# Patient Record
Sex: FEMALE | Race: OTHER | HISPANIC OR LATINO | Employment: FULL TIME | URBAN - METROPOLITAN AREA
[De-identification: names, ages, dates, MRNs, and addresses within clinical notes are randomized per-mention and may not be internally consistent; named-entity substitution may affect disease eponyms.]

---

## 2022-04-21 SDOH — ECONOMIC STABILITY: FOOD INSECURITY: WITHIN THE PAST 12 MONTHS, YOU WORRIED THAT YOUR FOOD WOULD RUN OUT BEFORE YOU GOT MONEY TO BUY MORE.: NEVER TRUE

## 2022-04-21 SDOH — ECONOMIC STABILITY: TRANSPORTATION INSECURITY
IN THE PAST 12 MONTHS, HAS THE LACK OF TRANSPORTATION KEPT YOU FROM MEDICAL APPOINTMENTS OR FROM GETTING MEDICATIONS?: NO

## 2022-04-21 SDOH — ECONOMIC STABILITY: INCOME INSECURITY: IN THE LAST 12 MONTHS, WAS THERE A TIME WHEN YOU WERE NOT ABLE TO PAY THE MORTGAGE OR RENT ON TIME?: NO

## 2022-04-21 SDOH — ECONOMIC STABILITY: INCOME INSECURITY: HOW HARD IS IT FOR YOU TO PAY FOR THE VERY BASICS LIKE FOOD, HOUSING, MEDICAL CARE, AND HEATING?: NOT VERY HARD

## 2022-04-21 SDOH — HEALTH STABILITY: PHYSICAL HEALTH: ON AVERAGE, HOW MANY DAYS PER WEEK DO YOU ENGAGE IN MODERATE TO STRENUOUS EXERCISE (LIKE A BRISK WALK)?: 3 DAYS

## 2022-04-21 SDOH — ECONOMIC STABILITY: HOUSING INSECURITY
IN THE LAST 12 MONTHS, WAS THERE A TIME WHEN YOU DID NOT HAVE A STEADY PLACE TO SLEEP OR SLEPT IN A SHELTER (INCLUDING NOW)?: NO

## 2022-04-21 SDOH — ECONOMIC STABILITY: TRANSPORTATION INSECURITY
IN THE PAST 12 MONTHS, HAS LACK OF TRANSPORTATION KEPT YOU FROM MEETINGS, WORK, OR FROM GETTING THINGS NEEDED FOR DAILY LIVING?: NO

## 2022-04-21 SDOH — ECONOMIC STABILITY: HOUSING INSECURITY: IN THE LAST 12 MONTHS, HOW MANY PLACES HAVE YOU LIVED?: 3

## 2022-04-21 SDOH — HEALTH STABILITY: PHYSICAL HEALTH: ON AVERAGE, HOW MANY MINUTES DO YOU ENGAGE IN EXERCISE AT THIS LEVEL?: 30 MIN

## 2022-04-21 SDOH — ECONOMIC STABILITY: FOOD INSECURITY: WITHIN THE PAST 12 MONTHS, THE FOOD YOU BOUGHT JUST DIDN'T LAST AND YOU DIDN'T HAVE MONEY TO GET MORE.: NEVER TRUE

## 2022-04-21 SDOH — ECONOMIC STABILITY: TRANSPORTATION INSECURITY
IN THE PAST 12 MONTHS, HAS LACK OF RELIABLE TRANSPORTATION KEPT YOU FROM MEDICAL APPOINTMENTS, MEETINGS, WORK OR FROM GETTING THINGS NEEDED FOR DAILY LIVING?: NO

## 2022-04-21 SDOH — HEALTH STABILITY: MENTAL HEALTH
STRESS IS WHEN SOMEONE FEELS TENSE, NERVOUS, ANXIOUS, OR CAN'T SLEEP AT NIGHT BECAUSE THEIR MIND IS TROUBLED. HOW STRESSED ARE YOU?: RATHER MUCH

## 2022-04-21 ASSESSMENT — SOCIAL DETERMINANTS OF HEALTH (SDOH)
HOW OFTEN DO YOU HAVE SIX OR MORE DRINKS ON ONE OCCASION: NEVER
HOW OFTEN DO YOU ATTENT MEETINGS OF THE CLUB OR ORGANIZATION YOU BELONG TO?: NEVER
HOW OFTEN DO YOU ATTEND CHURCH OR RELIGIOUS SERVICES?: NEVER
HOW OFTEN DO YOU ATTEND CHURCH OR RELIGIOUS SERVICES?: NEVER
HOW HARD IS IT FOR YOU TO PAY FOR THE VERY BASICS LIKE FOOD, HOUSING, MEDICAL CARE, AND HEATING?: NOT VERY HARD
HOW OFTEN DO YOU GET TOGETHER WITH FRIENDS OR RELATIVES?: NEVER
IN A TYPICAL WEEK, HOW MANY TIMES DO YOU TALK ON THE PHONE WITH FAMILY, FRIENDS, OR NEIGHBORS?: NEVER
HOW OFTEN DO YOU HAVE A DRINK CONTAINING ALCOHOL: MONTHLY OR LESS
WITHIN THE PAST 12 MONTHS, YOU WORRIED THAT YOUR FOOD WOULD RUN OUT BEFORE YOU GOT THE MONEY TO BUY MORE: NEVER TRUE
HOW OFTEN DO YOU GET TOGETHER WITH FRIENDS OR RELATIVES?: NEVER
HOW OFTEN DO YOU ATTENT MEETINGS OF THE CLUB OR ORGANIZATION YOU BELONG TO?: NEVER
HOW MANY DRINKS CONTAINING ALCOHOL DO YOU HAVE ON A TYPICAL DAY WHEN YOU ARE DRINKING: 1 OR 2
DO YOU BELONG TO ANY CLUBS OR ORGANIZATIONS SUCH AS CHURCH GROUPS UNIONS, FRATERNAL OR ATHLETIC GROUPS, OR SCHOOL GROUPS?: NO
DO YOU BELONG TO ANY CLUBS OR ORGANIZATIONS SUCH AS CHURCH GROUPS UNIONS, FRATERNAL OR ATHLETIC GROUPS, OR SCHOOL GROUPS?: NO
IN A TYPICAL WEEK, HOW MANY TIMES DO YOU TALK ON THE PHONE WITH FAMILY, FRIENDS, OR NEIGHBORS?: NEVER

## 2022-04-21 ASSESSMENT — LIFESTYLE VARIABLES
HOW MANY STANDARD DRINKS CONTAINING ALCOHOL DO YOU HAVE ON A TYPICAL DAY: 1 OR 2
HOW OFTEN DO YOU HAVE SIX OR MORE DRINKS ON ONE OCCASION: NEVER
HOW OFTEN DO YOU HAVE A DRINK CONTAINING ALCOHOL: MONTHLY OR LESS

## 2022-04-22 ENCOUNTER — OFFICE VISIT (OUTPATIENT)
Dept: MEDICAL GROUP | Facility: PHYSICIAN GROUP | Age: 37
End: 2022-04-22
Payer: COMMERCIAL

## 2022-04-22 VITALS
SYSTOLIC BLOOD PRESSURE: 100 MMHG | WEIGHT: 210 LBS | TEMPERATURE: 98 F | HEIGHT: 68 IN | HEART RATE: 69 BPM | OXYGEN SATURATION: 98 % | BODY MASS INDEX: 31.83 KG/M2 | DIASTOLIC BLOOD PRESSURE: 62 MMHG

## 2022-04-22 DIAGNOSIS — Z76.89 ENCOUNTER TO ESTABLISH CARE: ICD-10-CM

## 2022-04-22 DIAGNOSIS — G43.009 MIGRAINE WITHOUT AURA AND WITHOUT STATUS MIGRAINOSUS, NOT INTRACTABLE: ICD-10-CM

## 2022-04-22 DIAGNOSIS — Z00.00 WELLNESS EXAMINATION: ICD-10-CM

## 2022-04-22 DIAGNOSIS — R63.5 EXCESSIVE WEIGHT GAIN: ICD-10-CM

## 2022-04-22 DIAGNOSIS — Z87.898 HISTORY OF POSTNASAL DRIP: ICD-10-CM

## 2022-04-22 DIAGNOSIS — K21.9 GASTROESOPHAGEAL REFLUX DISEASE, UNSPECIFIED WHETHER ESOPHAGITIS PRESENT: ICD-10-CM

## 2022-04-22 DIAGNOSIS — E66.9 OBESITY (BMI 30-39.9): ICD-10-CM

## 2022-04-22 PROCEDURE — 99204 OFFICE O/P NEW MOD 45 MIN: CPT | Performed by: NURSE PRACTITIONER

## 2022-04-22 RX ORDER — RIZATRIPTAN BENZOATE 10 MG/1
TABLET ORAL
COMMUNITY
Start: 2019-02-11 | End: 2022-05-10 | Stop reason: SDUPTHER

## 2022-04-22 RX ORDER — OMEPRAZOLE 20 MG/1
20 CAPSULE, DELAYED RELEASE ORAL DAILY
Qty: 90 CAPSULE | Refills: 1 | Status: SHIPPED | OUTPATIENT
Start: 2022-04-22 | End: 2022-04-26 | Stop reason: SDUPTHER

## 2022-04-22 RX ORDER — PROPRANOLOL HYDROCHLORIDE 160 MG/1
CAPSULE, EXTENDED RELEASE ORAL
COMMUNITY
Start: 2019-12-20 | End: 2022-05-10 | Stop reason: SDUPTHER

## 2022-04-22 RX ORDER — OMEPRAZOLE 20 MG/1
20 CAPSULE, DELAYED RELEASE ORAL DAILY
Qty: 90 CAPSULE | Refills: 0 | Status: SHIPPED | OUTPATIENT
Start: 2022-04-22 | End: 2022-04-22

## 2022-04-22 ASSESSMENT — PATIENT HEALTH QUESTIONNAIRE - PHQ9: CLINICAL INTERPRETATION OF PHQ2 SCORE: 0

## 2022-04-22 NOTE — PATIENT INSTRUCTIONS
Miralax 1cap + Docusate 1 tablet x3days; if still constipated may continue daily stool softner till stool is soft.  Increase oral fiber with fruits and vegetables.    Let me know if still having stomach pain after 1 month of Omeprazole.      For post nasal drainage, you can try OTC antihistamine like zyrtec, allegra, claritin; you can also try nasal irrigation with normal saline rinse or netti pot.      Constipation, Adult  Constipation is when a person:  · Poops (has a bowel movement) fewer times in a week than normal.  · Has a hard time pooping.  · Has poop that is dry, hard, or bigger than normal.  Follow these instructions at home:  Eating and drinking    · Eat foods that have a lot of fiber, such as:  ? Fresh fruits and vegetables.  ? Whole grains.  ? Beans.  · Eat less of foods that are high in fat, low in fiber, or overly processed, such as:  ? French fries.  ? Hamburgers.  ? Cookies.  ? Candy.  ? Soda.  · Drink enough fluid to keep your pee (urine) clear or pale yellow.  General instructions  · Exercise regularly or as told by your doctor.  · Go to the restroom when you feel like you need to poop. Do not hold it in.  · Take over-the-counter and prescription medicines only as told by your doctor. These include any fiber supplements.  · Do pelvic floor retraining exercises, such as:  ? Doing deep breathing while relaxing your lower belly (abdomen).  ? Relaxing your pelvic floor while pooping.  · Watch your condition for any changes.  · Keep all follow-up visits as told by your doctor. This is important.  Contact a doctor if:  · You have pain that gets worse.  · You have a fever.  · You have not pooped for 4 days.  · You throw up (vomit).  · You are not hungry.  · You lose weight.  · You are bleeding from the anus.  · You have thin, pencil-like poop (stool).  Get help right away if:  · You have a fever, and your symptoms suddenly get worse.  · You leak poop or have blood in your poop.  · Your belly feels hard  or bigger than normal (is bloated).  · You have very bad belly pain.  · You feel dizzy or you faint.  This information is not intended to replace advice given to you by your health care provider. Make sure you discuss any questions you have with your health care provider.  Document Released: 06/05/2009 Document Revised: 11/30/2018 Document Reviewed: 06/07/2017  Elsevier Patient Education © 2020 [employer]      Postnasal Drip  Postnasal drip is the feeling of mucus going down the back of your throat. Mucus is a slimy substance that moistens and cleans your nose and throat, as well as the air pockets in face bones near your forehead and cheeks (sinuses). Small amounts of mucus pass from your nose and sinuses down the back of your throat all the time. This is normal. When you produce too much mucus or the mucus gets too thick, you can feel it.  Some common causes of postnasal drip include:  · Having more mucus because of:  ? A cold or the flu.  ? Allergies.  ? Cold air.  ? Certain medicines.  · Having more mucus that is thicker because of:  ? A sinus or nasal infection.  ? Dry air.  ? A food allergy.  Follow these instructions at home:  Relieving discomfort    · Gargle with a salt-water mixture 3-4 times a day or as needed. To make a salt-water mixture, completely dissolve ½-1 tsp of salt in 1 cup of warm water.  · If the air in your home is dry, use a humidifier to add moisture to the air.  · Use a saline spray or container (neti pot) to flush out the nose (nasal irrigation). These methods can help clear away mucus and keep the nasal passages moist.  General instructions  · Take over-the-counter and prescription medicines only as told by your health care provider.  · Follow instructions from your health care provider about eating or drinking restrictions. You may need to avoid caffeine.  · Avoid things that you know you are allergic to (allergens), like dust, mold, pollen, pets, or certain foods.  · Drink enough  fluid to keep your urine pale yellow.  · Keep all follow-up visits as told by your health care provider. This is important.  Contact a health care provider if:  · You have a fever.  · You have a sore throat.  · You have difficulty swallowing.  · You have headache.  · You have sinus pain.  · You have a cough that does not go away.  · The mucus from your nose becomes thick and is green or yellow in color.  · You have cold or flu symptoms that last more than 10 days.  Summary  · Postnasal drip is the feeling of mucus going down the back of your throat.  · If your health care provider approves, use nasal irrigation or a nasal spray 2?4 times a day.  · Avoid things that you know you are allergic to (allergens), like dust, mold, pollen, pets, or certain foods.  This information is not intended to replace advice given to you by your health care provider. Make sure you discuss any questions you have with your health care provider.  Document Released: 04/02/2018 Document Revised: 04/10/2020 Document Reviewed: 04/02/2018  Elsevier Patient Education © 2020 [employer]

## 2022-04-22 NOTE — PROGRESS NOTES
"Subjective:     CC:    Chief Complaint   Patient presents with   • Establish Care   • Referral Needed     GI and Neuro         HISTORY OF THE PRESENT ILLNESS: Patient is a 36 y.o. female, here today to establish care. Recent relocation from [place] 10/2021. The below problems were discussed/reviewed at this visit:    Problem   Excessive Weight Gain   Gastroesophageal Reflux Disease    Reports epigastric pain x1 month; worse after she eats; no nausea/vomiting; stool sometimes hard, sometimes she will have small watery stool      History of Postnasal Drip    Reports post nasal drainage for some months now; sometimes right ear also gets congested and painful     Migraine Without Aura and Without Status Migrainosus, Not Intractable    Diagnosed migraine  by PCP; started seeing neurology around . Inderal was increased from 120mg to 160mg around .   Currently taking Inderal LA 160mg QD for prevention, Rizatriptan 10mg prn migraine          Current Outpatient Medications Ordered in Epic   Medication Sig Dispense Refill   • propranolol CR (INDERAL LA) 160 MG CAPSULE SR 24 HR capsule      • rizatriptan (MAXALT) 10 MG tablet      • omeprazole (PRILOSEC) 20 MG delayed-release capsule Take 1 Capsule by mouth every day. 90 Capsule 1     No current Epic-ordered facility-administered medications on file.        No past surgical history on file.     Allergies:  Morphine    Health Maintenance: Completed  - IUD Mirena/PAP 2019, normal PAP (get results); no menses  - A1, sexually active male/     ROS: per HPI      Objective:     Exam: /62 (BP Location: Left arm, Patient Position: Sitting, BP Cuff Size: Adult)   Pulse 69   Temp 36.7 °C (98 °F) (Temporal)   Ht 1.727 m (5' 8\")   Wt 95.3 kg (210 lb)   SpO2 98%  Body mass index is 31.93 kg/m².    Physical Exam  Constitutional:       Appearance: Normal appearance.   Cardiovascular:      Rate and Rhythm: Normal rate and regular rhythm.      Pulses: " Ann, patient's mother requesting to speak with writer. Writer informed patient's family members of no visitor policy at this time in the respiratory area of ED. When evaluation completed by MD-will alert that family wants update with patient's permission. Number for mother is [phone].   Normal pulses.      Heart sounds: Normal heart sounds.   Pulmonary:      Effort: Pulmonary effort is normal.      Breath sounds: Normal breath sounds.   Musculoskeletal:         General: Normal range of motion.      Cervical back: Normal range of motion and neck supple.   Skin:     General: Skin is warm and dry.   Neurological:      General: No focal deficit present.      Mental Status: She is alert and oriented to person, place, and time.   Psychiatric:         Mood and Affect: Mood normal.         Behavior: Behavior normal.         Thought Content: Thought content normal.         Judgment: Judgment normal.       Assessment & Plan:   36 y.o. female with the following -    Problem List Items Addressed This Visit     Migraine without aura and without status migrainosus, not intractable     Migraines managed on current regimen; last prn Rizatriptan used 3 weeks ago, relief with 1 dose   - continue Inderal LA 160mg QD for prevention, Rizatriptan 10mg prn migraine  - she would like to remain under care of neurology so I have sent referral to establish with headache clinic         Relevant Medications    propranolol CR (INDERAL LA) 160 MG CAPSULE SR 24 HR capsule    rizatriptan (MAXALT) 10 MG tablet    Other Relevant Orders    Comp Metabolic Panel    Referral to Neurology    Excessive weight gain     Reports weight gain the past year, despite increasing exercise  - we discussed role of dietary intake which she is also watching  - check TSH, lipid, CMP         Relevant Orders    Comp Metabolic Panel    Lipid Profile    TSH WITH REFLEX TO FT4    Gastroesophageal reflux disease     epigastric pain x1 month; worse after she eats; no nausea/vomiting; stool sometimes hard, sometimes she will have small watery stool; normal BS on exam, epigastric tender with palpation  - she will take OTC laxative/stool softner for 1-3 days to relieve her constipation   - start omeprazole 20mg QD; may trial off after 2-4 weeks if symptoms are  gone  - increase dietary fiber (fresh fruits, vegetables)         Relevant Medications    omeprazole (PRILOSEC) 20 MG delayed-release capsule    History of postnasal drip     Throat, ears/TM normal on exam today  - may trial OTC antihistamine daily for 2-4 weeks           Other Visit Diagnoses     Wellness examination        Relevant Orders    Comp Metabolic Panel    Lipid Profile    TSH WITH REFLEX TO FT4        Educated in proper administration of medication(s) ordered today including safety, possible SE, risks, benefits, rationale and alternatives to therapy.     Return in about 3 months (around 7/22/2022) for PAP.    Please note that this dictation was created using voice recognition software. I have made every reasonable attempt to correct obvious errors, but I expect that there are errors of grammar and possibly content that I did not discover before finalizing the note.

## 2022-04-23 PROBLEM — K21.9 GASTROESOPHAGEAL REFLUX DISEASE: Status: ACTIVE | Noted: 2022-04-23

## 2022-04-23 PROBLEM — R63.5 EXCESSIVE WEIGHT GAIN: Status: ACTIVE | Noted: 2022-04-23

## 2022-04-23 PROBLEM — Z87.898 HISTORY OF POSTNASAL DRIP: Status: ACTIVE | Noted: 2022-04-23

## 2022-04-23 PROBLEM — E66.9 OBESITY (BMI 30-39.9): Status: ACTIVE | Noted: 2022-04-23

## 2022-04-23 NOTE — ASSESSMENT & PLAN NOTE
epigastric pain x1 month; worse after she eats; no nausea/vomiting; stool sometimes hard, sometimes she will have small watery stool; normal BS on exam, epigastric tender with palpation  - she will take OTC laxative/stool softner for 1-3 days to relieve her constipation   - start omeprazole 20mg QD; may trial off after 2-4 weeks if symptoms are gone  - increase dietary fiber (fresh fruits, vegetables)

## 2022-04-23 NOTE — ASSESSMENT & PLAN NOTE
Migraines managed on current regimen; last prn Rizatriptan used 3 weeks ago, relief with 1 dose   - continue Inderal LA 160mg QD for prevention, Rizatriptan 10mg prn migraine  - she would like to remain under care of neurology so I have sent referral to establish with headache clinic

## 2022-04-23 NOTE — ASSESSMENT & PLAN NOTE
Reports weight gain the past year, despite increasing exercise  - we discussed role of dietary intake which she is also watching  - check TSH, lipid, CMP

## 2022-04-26 ENCOUNTER — HOSPITAL ENCOUNTER (OUTPATIENT)
Dept: LAB | Facility: MEDICAL CENTER | Age: 37
End: 2022-04-26
Attending: NURSE PRACTITIONER
Payer: COMMERCIAL

## 2022-04-26 DIAGNOSIS — G43.009 MIGRAINE WITHOUT AURA AND WITHOUT STATUS MIGRAINOSUS, NOT INTRACTABLE: ICD-10-CM

## 2022-04-26 DIAGNOSIS — Z00.00 WELLNESS EXAMINATION: ICD-10-CM

## 2022-04-26 DIAGNOSIS — R63.5 EXCESSIVE WEIGHT GAIN: ICD-10-CM

## 2022-04-26 DIAGNOSIS — K21.9 GASTROESOPHAGEAL REFLUX DISEASE, UNSPECIFIED WHETHER ESOPHAGITIS PRESENT: ICD-10-CM

## 2022-04-26 DIAGNOSIS — E66.9 OBESITY (BMI 30-39.9): ICD-10-CM

## 2022-04-26 LAB
ALBUMIN SERPL BCP-MCNC: 3.6 G/DL (ref 3.2–4.9)
ALBUMIN/GLOB SERPL: 1.6 G/DL
ALP SERPL-CCNC: 34 U/L (ref 30–99)
ALT SERPL-CCNC: 12 U/L (ref 2–50)
ANION GAP SERPL CALC-SCNC: 9 MMOL/L (ref 7–16)
AST SERPL-CCNC: 18 U/L (ref 12–45)
BILIRUB SERPL-MCNC: 0.5 MG/DL (ref 0.1–1.5)
BUN SERPL-MCNC: 11 MG/DL (ref 8–22)
CALCIUM SERPL-MCNC: 9 MG/DL (ref 8.5–10.5)
CHLORIDE SERPL-SCNC: 107 MMOL/L (ref 96–112)
CHOLEST SERPL-MCNC: 141 MG/DL (ref 100–199)
CO2 SERPL-SCNC: 24 MMOL/L (ref 20–33)
CREAT SERPL-MCNC: 0.59 MG/DL (ref 0.5–1.4)
FASTING STATUS PATIENT QL REPORTED: NORMAL
GFR SERPLBLD CREATININE-BSD FMLA CKD-EPI: 121 ML/MIN/1.73 M 2
GLOBULIN SER CALC-MCNC: 2.3 G/DL (ref 1.9–3.5)
GLUCOSE SERPL-MCNC: 89 MG/DL (ref 65–99)
HDLC SERPL-MCNC: 52 MG/DL
LDLC SERPL CALC-MCNC: 81 MG/DL
POTASSIUM SERPL-SCNC: 4.5 MMOL/L (ref 3.6–5.5)
PROT SERPL-MCNC: 5.9 G/DL (ref 6–8.2)
SODIUM SERPL-SCNC: 140 MMOL/L (ref 135–145)
TRIGL SERPL-MCNC: 42 MG/DL (ref 0–149)
TSH SERPL DL<=0.005 MIU/L-ACNC: 3.07 UIU/ML (ref 0.38–5.33)

## 2022-04-26 PROCEDURE — 80053 COMPREHEN METABOLIC PANEL: CPT

## 2022-04-26 PROCEDURE — 80061 LIPID PANEL: CPT

## 2022-04-26 PROCEDURE — 36415 COLL VENOUS BLD VENIPUNCTURE: CPT

## 2022-04-26 PROCEDURE — 84443 ASSAY THYROID STIM HORMONE: CPT

## 2022-04-26 RX ORDER — OMEPRAZOLE 20 MG/1
20 CAPSULE, DELAYED RELEASE ORAL DAILY
Qty: 90 CAPSULE | Refills: 3 | Status: SHIPPED | OUTPATIENT
Start: 2022-04-26 | End: 2022-08-24

## 2022-05-03 ENCOUNTER — TELEPHONE (OUTPATIENT)
Dept: MEDICAL GROUP | Facility: PHYSICIAN GROUP | Age: 37
End: 2022-05-03
Payer: COMMERCIAL

## 2022-05-03 NOTE — TELEPHONE ENCOUNTER
MEDICATION PRIOR AUTHORIZATION NEEDED:    1. Name of Medication: Omeprazole    2. Requested By (Name of Pharmacy): [facility]     3. Is insurance on file current? yes    Pt informed that the omeprazole is not covered by insurance. I did look it up on [facility] and she can get it from the pharmacy with coupon for 3$-20$ depending on where she goes.

## 2022-05-10 ENCOUNTER — OFFICE VISIT (OUTPATIENT)
Dept: NEUROLOGY | Facility: MEDICAL CENTER | Age: 37
End: 2022-05-10
Attending: NURSE PRACTITIONER
Payer: COMMERCIAL

## 2022-05-10 VITALS
SYSTOLIC BLOOD PRESSURE: 110 MMHG | HEART RATE: 58 BPM | HEIGHT: 69 IN | DIASTOLIC BLOOD PRESSURE: 82 MMHG | BODY MASS INDEX: 31.55 KG/M2 | WEIGHT: 213 LBS | OXYGEN SATURATION: 98 % | RESPIRATION RATE: 16 BRPM | TEMPERATURE: 98 F

## 2022-05-10 DIAGNOSIS — G43.011 INTRACTABLE MIGRAINE WITHOUT AURA AND WITH STATUS MIGRAINOSUS: ICD-10-CM

## 2022-05-10 PROCEDURE — 99203 OFFICE O/P NEW LOW 30 MIN: CPT | Performed by: NURSE PRACTITIONER

## 2022-05-10 PROCEDURE — 99211 OFF/OP EST MAY X REQ PHY/QHP: CPT | Performed by: NURSE PRACTITIONER

## 2022-05-10 RX ORDER — PROPRANOLOL HYDROCHLORIDE 160 MG/1
160 CAPSULE, EXTENDED RELEASE ORAL
Qty: 90 CAPSULE | Refills: 3 | Status: SHIPPED | OUTPATIENT
Start: 2022-05-10 | End: 2023-05-10

## 2022-05-10 RX ORDER — RIZATRIPTAN BENZOATE 10 MG/1
10 TABLET ORAL
Qty: 10 TABLET | Refills: 11 | Status: SHIPPED | OUTPATIENT
Start: 2022-05-10 | End: 2023-05-10

## 2022-05-10 ASSESSMENT — ENCOUNTER SYMPTOMS
BACK PAIN: 0
WEAKNESS: 0
NERVOUS/ANXIOUS: 1
NECK PAIN: 0
NAUSEA: 0
FOCAL WEAKNESS: 0
HEARTBURN: 1
BLURRED VISION: 0
SINUS PAIN: 0
HEADACHES: 1
DEPRESSION: 1
COUGH: 0
VOMITING: 0
SPEECH CHANGE: 0
INSOMNIA: 1

## 2022-05-10 ASSESSMENT — PAIN SCALES - GENERAL: PAINLEVEL: NO PAIN

## 2022-05-10 NOTE — PROGRESS NOTES
Subjective      HPI     Arely Matt is a 36 y.o. female who presents for chronic migraines.    She was referred by her PCP Dr. Inman     She moved here from [place] recently, she had a neurologist there.      Had MRI of brain in [place] 4/2020 during a very bad migraine spell, she states it was normal.     PMH:  GERD  Social: She denies drug or tobacco use, rare alcohol, she works in IT for the [employer]     Age at Onset:  14  Triggers alcohol, stress, dehydration   Alleviating factors:  Laying down in dark room, warmth and ice,  Meds tried and result:         Preventative:  Medication Dose/length of treatment Result/side effects   Inderal LA 160mg X 2 years Reduced migraines from 10 per month to 3-4 month, they are much less severe   Topiramate  Speech difficulty   Nortriptyline  Excessive fatigue                                         Abortive:   Medication Dose/length of treatment Result/side effects   Rizatriptan   Knocks out headache   Ibuprofen  helps                                        Hormones:  none  Caffeine use: rare  OTC medications--frequency: twice per week ibuprofen  How many days per month:3-4 days per month  Missed days of school/work in past 6 months:  none  Characteristics:                   A) Location: on the left side, behind eye temple, base of neck              B)Duration:  Longest one lasted up to 2 weeks, typically can last 1-2 days without medicine, with medicine lasts for 2 hours.              C)Intensity: 5-6/10               D) Quality of pain: pressure, sharp pain in eyes              E) Associated Symptoms: blurry vision, difficulty concentrating   N&V: both   Photo/phonophobia:  Both   Aura: none   Prodrome: none  ER/Urgent care visits in past 6 months: none   Sleep schedule:  Tries to stay on a regular schedule, has difficulty falling asleep, gets about 5 hours of sleep.       Review of Systems   HENT: Negative for congestion and sinus pain.    Eyes: Negative for  "blurred vision.   Respiratory: Negative for cough.    Cardiovascular: Negative for chest pain.   Gastrointestinal: Positive for heartburn. Negative for nausea and vomiting.   Musculoskeletal: Negative for back pain and neck pain.   Neurological: Positive for headaches. Negative for speech change, focal weakness and weakness.   Psychiatric/Behavioral: Positive for depression. The patient is nervous/anxious and has insomnia.          Objective     /82 (BP Location: Left arm, Patient Position: Sitting, BP Cuff Size: Adult)   Pulse (!) 58   Temp 36.7 °C (98 °F) (Temporal)   Resp 16   Ht 1.753 m (5' 9\")   Wt 96.6 kg (213 lb)   SpO2 98%   BMI 31.45 kg/m²        PHYSICAL ASSESSMENT  Constitutional:  Alert, no apparent distress,  Psych:   mood and affect WNL  Muskuloskeletal:  Moves all extremities equally, strength 5/5  BUE/BLE flexors/extensors, no drift  NEUROLOGICAL ASSESSMENT  Oriented X 4, speech fluent, naming and memory intact  CN II: Visual fields are full to confrontation. Fundoscopic exam is normal with sharp discs and no vascular changes. Pupils are 4 mm and briskly reactive to light.   CN III: IV, VI  EOMs intact, no ptosis  CN V: Facial sensation is intact to pinprick in all 3 divisions bilaterally. Corneal responses are intact.  CN VII: Face is symmetric with normal eye closure and smile.  CN VIII Hearing is normal to rubbing fingers  CN IX, X: Palate elevates symmetrically. Phonation is normal.  CN XI: Head turning and shoulder shrug are intact  CN XII: Tongue is midline with normal movements and no atrophy.                           Sensation to PP equal bilaterally                 No limb ataxia with finger to nose and heel to shin                 Ambulates with steady gait.                 Rhomberg negative                Biceps,brachioradialis, tricep, and patellar reflexes all 2+     Cardiovascular:    S1S2, no abnormal rhythm auscultated, no peripheral edema  Neck:                     No " carotid bruits noted   Pulmonary:            Respirations easy, lungs clear to auscultation all fields.     Skin:                     No obvious rashes.             Assessment & Plan     1. Intractable migraine without aura and with status migrainosus     Well controlled with Inderal, gets 100% relief with Rizatriptan      Continue Inderal CR 160mg daily    Continue Rizatriptan for rescue.      I recommend the following over the counter supplements every night at bedtime:  Start magnesium oxide 400mg gel cap by mouth every night, may take extra dose if needed for headache (over the counter), hold for diarrhea         Start Riboflavin (Vitamin B2) 400mg by mouth every night (over the counter),may turn urine bright yellow         Start COQ 10, take 300mg every night. (over the counter)          Attempt to go to bed and get up at the same time every night           Eat meals on regular basis            Stay hydrated.             Aerobic exercise 30 minutes daily             Avoid aged or smoked foods, avoid processed foods, red wine, aged cheese              Keep headache diary, include foods that you may have eaten.             Avoid overusing over the counter medications:  Do not take more than 500mg acetaminophen (tylenol), more than 4 times weekly, more frequent or larger doses are associated with medication overuse headache.        I counseled patient on migraine triggers, lifestyle changes, medication overuse, supplements and medication side effects.    Follow up in 6 months.

## 2022-05-10 NOTE — PATIENT INSTRUCTIONS
I recommend the following over the counter supplements every night at bedtime:  Start magnesium oxide 400mg gel cap by mouth every night, may take extra dose if needed for headache (over the counter), hold for diarrhea         Start Riboflavin (Vitamin B2) 400mg by mouth every night (over the counter),may turn urine bright yellow         Start COQ 10, take 300mg every night. (over the counter)          Attempt to go to bed and get up at the same time every night           Eat meals on regular basis            Stay hydrated.             Aerobic exercise 30 minutes daily             Avoid aged or smoked foods, avoid processed foods, red wine, aged cheese              Keep headache diary, include foods that you may have eaten.             Avoid overusing over the counter medications:  Do not take more than 500mg acetaminophen (tylenol), more than 4 times weekly, more frequent or larger doses are associated with medication overuse headache.            Migraine Headache  A migraine headache is a very strong throbbing pain on one side or both sides of your head. This type of headache can also cause other symptoms. It can last from 4 hours to 3 days. Talk with your doctor about what things may bring on (trigger) this condition.  What are the causes?  The exact cause of this condition is not known. This condition may be triggered or caused by:  · Drinking alcohol.  · Smoking.  · Taking medicines, such as:  ? Medicine used to treat chest pain (nitroglycerin).  ? Birth control pills.  ? Estrogen.  ? Some blood pressure medicines.  · Eating or drinking certain products.  · Doing physical activity.  Other things that may trigger a migraine headache include:  · Having a menstrual period.  · Pregnancy.  · Hunger.  · Stress.  · Not getting enough sleep or getting too much sleep.  · Weather changes.  · Tiredness (fatigue).  What increases the risk?  · Being 25-55 years old.  · Being female.  · Having a family history of migraine  headaches.  · Being .  · Having depression or anxiety.  · Being very overweight.  What are the signs or symptoms?  · A throbbing pain. This pain may:  ? Happen in any area of the head, such as on one side or both sides.  ? Make it hard to do daily activities.  ? Get worse with physical activity.  ? Get worse around bright lights or loud noises.  · Other symptoms may include:  ? Feeling sick to your stomach (nauseous).  ? Vomiting.  ? Dizziness.  ? Being sensitive to bright lights, loud noises, or smells.  · Before you get a migraine headache, you may get warning signs (an aura). An aura may include:  ? Seeing flashing lights or having blind spots.  ? Seeing bright spots, halos, or zigzag lines.  ? Having tunnel vision or blurred vision.  ? Having numbness or a tingling feeling.  ? Having trouble talking.  ? Having weak muscles.  · Some people have symptoms after a migraine headache (postdromal phase), such as:  ? Tiredness.  ? Trouble thinking (concentrating).  How is this treated?  · Taking medicines that:  ? Relieve pain.  ? Relieve the feeling of being sick to your stomach.  ? Prevent migraine headaches.  · Treatment may also include:  ? Having acupuncture.  ? Avoiding foods that bring on migraine headaches.  ? Learning ways to control your body functions (biofeedback).  ? Therapy to help you know and deal with negative thoughts (cognitive behavioral therapy).  Follow these instructions at home:  Medicines  · Take over-the-counter and prescription medicines only as told by your doctor.  · Ask your doctor if the medicine prescribed to you:  ? Requires you to avoid driving or using heavy machinery.  ? Can cause trouble pooping (constipation). You may need to take these steps to prevent or treat trouble pooping:  § Drink enough fluid to keep your pee (urine) pale yellow.  § Take over-the-counter or prescription medicines.  § Eat foods that are high in fiber. These include beans, whole grains, and fresh  fruits and vegetables.  § Limit foods that are high in fat and sugar. These include fried or sweet foods.  Lifestyle  · Do not drink alcohol.  · Do not use any products that contain nicotine or tobacco, such as cigarettes, e-cigarettes, and chewing tobacco. If you need help quitting, ask your doctor.  · Get at least 8 hours of sleep every night.  · Limit and deal with stress.  General instructions         · Keep a journal to find out what may bring on your migraine headaches. For example, write down:  ? What you eat and drink.  ? How much sleep you get.  ? Any change in what you eat or drink.  ? Any change in your medicines.  · If you have a migraine headache:  ? Avoid things that make your symptoms worse, such as bright lights.  ? It may help to lie down in a dark, quiet room.  ? Do not drive or use heavy machinery.  ? Ask your doctor what activities are safe for you.  · Keep all follow-up visits as told by your doctor. This is important.  Contact a doctor if:  · You get a migraine headache that is different or worse than others you have had.  · You have more than 15 headache days in one month.  Get help right away if:  · Your migraine headache gets very bad.  · Your migraine headache lasts longer than 72 hours.  · You have a fever.  · You have a stiff neck.  · You have trouble seeing.  · Your muscles feel weak or like you cannot control them.  · You start to lose your balance a lot.  · You start to have trouble walking.  · You pass out (faint).  · You have a seizure.  Summary  · A migraine headache is a very strong throbbing pain on one side or both sides of your head. These headaches can also cause other symptoms.  · This condition may be treated with medicines and changes to your lifestyle.  · Keep a journal to find out what may bring on your migraine headaches.  · Contact a doctor if you get a migraine headache that is different or worse than others you have had.  · Contact your doctor if you have more than 15  headache days in a month.  This information is not intended to replace advice given to you by your health care provider. Make sure you discuss any questions you have with your health care provider.  Document Released: 09/26/2009 Document Revised: 04/10/2020 Document Reviewed: 01/30/2020  Elsevier Patient Education © 2020 [employer]

## 2022-08-24 ENCOUNTER — OFFICE VISIT (OUTPATIENT)
Dept: MEDICAL GROUP | Facility: PHYSICIAN GROUP | Age: 37
End: 2022-08-24
Payer: COMMERCIAL

## 2022-08-24 ENCOUNTER — HOSPITAL ENCOUNTER (OUTPATIENT)
Facility: MEDICAL CENTER | Age: 37
End: 2022-08-24
Attending: NURSE PRACTITIONER
Payer: COMMERCIAL

## 2022-08-24 VITALS
OXYGEN SATURATION: 98 % | WEIGHT: 216 LBS | BODY MASS INDEX: 31.99 KG/M2 | HEIGHT: 69 IN | DIASTOLIC BLOOD PRESSURE: 60 MMHG | SYSTOLIC BLOOD PRESSURE: 104 MMHG | HEART RATE: 74 BPM | TEMPERATURE: 97 F

## 2022-08-24 DIAGNOSIS — Z12.4 CERVICAL CANCER SCREENING: ICD-10-CM

## 2022-08-24 DIAGNOSIS — Z11.3 ROUTINE SCREENING FOR STI (SEXUALLY TRANSMITTED INFECTION): ICD-10-CM

## 2022-08-24 DIAGNOSIS — Z01.419 WELL WOMAN EXAM WITH ROUTINE GYNECOLOGICAL EXAM: ICD-10-CM

## 2022-08-24 PROCEDURE — 99000 SPECIMEN HANDLING OFFICE-LAB: CPT | Performed by: NURSE PRACTITIONER

## 2022-08-24 PROCEDURE — 87624 HPV HI-RISK TYP POOLED RSLT: CPT

## 2022-08-24 PROCEDURE — 99395 PREV VISIT EST AGE 18-39: CPT | Performed by: NURSE PRACTITIONER

## 2022-08-24 PROCEDURE — 87591 N.GONORRHOEAE DNA AMP PROB: CPT

## 2022-08-24 PROCEDURE — 87491 CHLMYD TRACH DNA AMP PROBE: CPT

## 2022-08-24 PROCEDURE — 88175 CYTOPATH C/V AUTO FLUID REDO: CPT

## 2022-08-24 NOTE — PROGRESS NOTES
CC:  Pap/Well Woman Exam    History of present illness:  Arely Matt is 36 y.o. female presenting today for well woman exam with gynecological exam and Pap smear.  She is concerned about weight gain this year, TSH was normal in April. We discussed several dietary changes  & ways to increase cardiovascular exercise as well as strength building. Also mentioned weight loss program with Dr Rodriguez & option for referral to see nutritionist. She will start with food logging and let me know at next visit what plan she would like to pursue.    GYN Hx  A1  LMP- 2019; irregular spotting since IUD  Last PAP- 2019; Normal per patient  Birth Control- Mirena IUD inserted 2019  Sexually Active - yes with male partner/  Last STD screen- , normal per patient; GC/chlam today    Past Medical History:   Diagnosis Date    Migraine        No past surgical history on file.    Outpatient Encounter Medications as of 2022   Medication Sig Dispense Refill    propranolol CR (INDERAL LA) 160 MG CAPSULE SR 24 HR capsule Take 1 Capsule by mouth every day. 90 Capsule 3    rizatriptan (MAXALT) 10 MG tablet Take 1 Tablet by mouth 1 time a day as needed for Migraine. 10 Tablet 11    omeprazole (PRILOSEC) 20 MG delayed-release capsule Take 1 Capsule by mouth every day. 90 Capsule 3     No facility-administered encounter medications on file as of 2022.     Patient Active Problem List    Diagnosis Date Noted    Intractable migraine without aura and with status migrainosus 05/10/2022    Excessive weight gain 2022    Gastroesophageal reflux disease 2022    History of postnasal drip 2022    Obesity (BMI 30-39.9) 2022    Migraine without aura and without status migrainosus, not intractable 2022   .  Social History     Socioeconomic History    Marital status:      Spouse name: Not on file    Number of children: Not on file    Years of education: Not on file    Highest education  level: Associate degree: academic program   Occupational History    Not on file   Tobacco Use    Smoking status: Never    Smokeless tobacco: Never   Substance and Sexual Activity    Alcohol use: Yes     Comment: occasional    Drug use: Never    Sexual activity: Yes     Partners: Male     Birth control/protection: I.U.D.   Other Topics Concern    Not on file   Social History Narrative    Not on file     Social Determinants of Health     Financial Resource Strain: Low Risk     Difficulty of Paying Living Expenses: Not very hard   Food Insecurity: No Food Insecurity    Worried About Running Out of Food in the Last Year: Never true    Ran Out of Food in the Last Year: Never true   Transportation Needs: No Transportation Needs    Lack of Transportation (Medical): No    Lack of Transportation (Non-Medical): No   Physical Activity: Insufficiently Active    Days of Exercise per Week: 3 days    Minutes of Exercise per Session: 30 min   Stress: Stress Concern Present    Feeling of Stress : Rather much   Social Connections: Socially Isolated    Frequency of Communication with Friends and Family: Never    Frequency of Social Gatherings with Friends and Family: Never    Attends [employer] Services: Never    Active Member of Clubs or Organizations: No    Attends Club or Organization Meetings: Never    Marital Status:    Intimate Partner Violence: Not on file   Housing Stability: High Risk    Unable to Pay for Housing in the Last Year: No    Number of Places Lived in the Last Year: 3    Unstable Housing in the Last Year: No       Family History   Problem Relation Age of Onset    Diabetes Mother     Hypertension Mother     Alcohol abuse Mother     Stroke Mother     Thyroid Mother     Breast Cancer Maternal Grandmother          ROS: Denies Weight loss, fatigue, chest pain, SOB, bowel or bladder changes. No significant dysmenorrhea, concerning vaginal discharge or irritation, no dyspareunia or postcoital bleeding. Denies h/o  "migraine with aura. Denies musculoskeletal, neurological, or psychiatric problems.    /60   Pulse 74   Temp 36.1 °C (97 °F) (Temporal)   Ht 1.753 m (5' 9\")   Wt 98 kg (216 lb)   SpO2 98%   BMI 31.90 kg/m²     GEN:  Appears well and in no apparent distress   NECK:  Supple without adenopathy or thyromegaly  LUNGS:  Clear and equal. No wheeze, ronchi, or rales.  CV:  RRR, S1, S2. No murmur.  Pedal pulses 2+ bilaterally.  BREAST:  Symmetrical without masses. No nipple discharge.  ABD:  Soft, non-tender, non-distended, normal bowel sounds.  No hepatosplenomegaly.  :  Normal external female genitalia.  Scant bleed noted in vaginal vault. Cervix appears normal. IUD strings visualize. Specimen collected from transformation zone. Bimanual exam:  No CMT, normal size uterus without masses or tenderness; no adnexal masses or tenderness.      Assessment and plan  1. Cervical cancer screening  2. Well woman exam with routine gynecological exam  3. Routine screening for STI (sexually transmitted infection)  - THINPREP PAP WITH HPV; Future  - Chlamydia/GC PCR Assoc.W/Thinprep; Future    F/u pending results    A chaperone was offered to the patient during today's exam. Patient declined chaperone.    "

## 2022-08-25 DIAGNOSIS — Z12.4 CERVICAL CANCER SCREENING: ICD-10-CM

## 2022-08-25 DIAGNOSIS — Z01.419 WELL WOMAN EXAM WITH ROUTINE GYNECOLOGICAL EXAM: ICD-10-CM

## 2022-08-25 DIAGNOSIS — Z11.3 ROUTINE SCREENING FOR STI (SEXUALLY TRANSMITTED INFECTION): ICD-10-CM

## 2022-08-25 LAB
CYTOLOGY REG CYTOL: NORMAL
HPV HR 12 DNA CVX QL NAA+PROBE: NEGATIVE
HPV16 DNA SPEC QL NAA+PROBE: NEGATIVE
HPV18 DNA SPEC QL NAA+PROBE: NEGATIVE
SPECIMEN SOURCE: NORMAL

## 2022-08-27 LAB
C TRACH DNA GENITAL QL NAA+PROBE: NEGATIVE
N GONORRHOEA DNA GENITAL QL NAA+PROBE: NEGATIVE
SPECIMEN SOURCE: NORMAL

## 2022-11-09 ENCOUNTER — TELEPHONE (OUTPATIENT)
Dept: NEUROLOGY | Facility: MEDICAL CENTER | Age: 37
End: 2022-11-09
Payer: COMMERCIAL

## 2022-11-09 NOTE — TELEPHONE ENCOUNTER
Established Patient     [employer] Patient is checked in Patient Demographics? Yes    Is visit type and length correct?  Yes    Is referral attached to visit? Yes    Were records received from referring provider? Yes    Patient was not contacted to have someone accompany them to visit?    Is this appointment scheduled as a Hospital Follow-Up?  No    Does the patient require any pre procedure or post procedure follow up? No    If any orders were placed at last visit or intended to be done for this visit do we have Results/Consult Notes? Yes  Labs - Labs were not ordered at last office visit.  Imaging - Imaging was not ordered at last office visit.  Referrals - No referrals were ordered at last office visit.        10.  If patient appointment is for Botox - is order pended for provider? No

## 2023-03-17 ENCOUNTER — OFFICE VISIT (OUTPATIENT)
Dept: NEUROLOGY | Facility: MEDICAL CENTER | Age: 38
End: 2023-03-17
Attending: PSYCHIATRY & NEUROLOGY
Payer: COMMERCIAL

## 2023-03-17 VITALS
DIASTOLIC BLOOD PRESSURE: 78 MMHG | TEMPERATURE: 97.4 F | BODY MASS INDEX: 31.25 KG/M2 | OXYGEN SATURATION: 100 % | WEIGHT: 211.64 LBS | SYSTOLIC BLOOD PRESSURE: 126 MMHG | HEART RATE: 80 BPM

## 2023-03-17 DIAGNOSIS — G43.011 INTRACTABLE MIGRAINE WITHOUT AURA AND WITH STATUS MIGRAINOSUS: ICD-10-CM

## 2023-03-17 PROCEDURE — 99211 OFF/OP EST MAY X REQ PHY/QHP: CPT | Performed by: PSYCHIATRY & NEUROLOGY

## 2023-03-17 PROCEDURE — 99213 OFFICE O/P EST LOW 20 MIN: CPT | Performed by: PSYCHIATRY & NEUROLOGY

## 2023-03-17 ASSESSMENT — PATIENT HEALTH QUESTIONNAIRE - PHQ9: CLINICAL INTERPRETATION OF PHQ2 SCORE: 0

## 2023-03-17 NOTE — PROGRESS NOTES
"[facility] NEUROLOGY  GENERAL NEUROLOGY  FOLLOW-UP VISIT    CC: \"migraine without aura...\"    INTERVAL HISTORY:  Arely Matt is a 37 y.o. woman with migraine without aura.  She last saw Ines on 5/10/2022.  At that time they agreed to continue Inderal 160 mg daily and rizatriptan PRN.  Today, she was unaccompanied, and she provided the following interval history:    Arely used to see a neurologist in [place], [place].    The following is a summary of headache symptoms, presented in my standard format:    Family History:   Age at onset:   Location: bi-frontal, right retro-orbital  Radiation:   Frequency: baseline: 1/week, lately:   Duration: baseline: \"a few days\"  Headache Days/Month:   Quality: as if the right [optic] nerve were stretching out\"  Intensity: baseline: 10/10, lately: 3-4/10  Aura: none  Photophobia/Phonophobia/Nausea/Vomiting: yes/yes/very rarely/one episode  Provoked by Physical Activity?:   Triggers: red wine, stress  Associated Symptoms: vertigo   Autonomic Signs (such as ptosis, miosis, conjunctival injection, rhinorrhea, increased lacrimation):   Head Trauma:   Association with Menses:   ED Visits: none  Hospitalizations: none  Missed Work Days ([facility]): not lately  Sleep: 6-7 hours/night  Caffeine Intake: 1 cup/day  Hydration: 48 oz/day  Nutrition: eats regularly  Exercise: nothing formal  Analgesic Overuse: lately: 1 every ~1-2 months    Current Medication Regimen:  - propranolol 160 mg, lower dosages were less effective  - rizatriptan: effective in ~30 minutes, associated with some jaw stiffness    Medications Tried: Response  Preventive:  - topiramate: ineffective    Rescue:  - Excedrin:     Medications Not Tried:  - amitriptyline:     MEDICATIONS:  Current Outpatient Medications   Medication Sig    propranolol CR (INDERAL LA) 160 MG CAPSULE SR 24 HR capsule Take 1 Capsule by mouth every day.    rizatriptan (MAXALT) 10 MG tablet Take 1 Tablet by mouth 1 time a day as " needed for Migraine.     MEDICAL, SOCIAL, AND FAMILY HISTORY:  There is no change in the patient's ROS or medical, social, or family histories since the previous visit on 5/10/2022.    REVIEW OF SYSTEMS:  A ROS was completed.  Pertinent positives and negatives were included in the HPI, above.  All other systems were reviewed and are negative.    PHYSICAL EXAM:  General/Medical:  - NAD    Neuro:  MENTAL STATUS: awake and alert; no deficits of speech or language; oriented to conversation; affect was appropriate to situation; pleasant, cooperative    CRANIAL NERVES:    II: acuity: NT, fields: NT, pupils: NT, discs: sharp    III/IV/VI: versions: grossly intact    V: facial sensation: NT    VII: facial expression: symmetric    VIII: hearing: intact to voice    IX/X: palate: NT    XI: shoulder shrug: NT    XII: tongue: NT    MOTOR:  - bulk: NT  - tone: NT  Upper Extremity Strength  (R/L)    NT   Elbow flexion NT   Elbow extension NT   Shoulder abduction NT     Lower Extremity Strength  (R/L)   Hip flexion NT   Knee extension NT   Knee flexion NT   Ankle plantarflexion NT   Ankle dorsiflexion NT     - pronator drift: NT  - abnormal movements: none    SENSATION:  - light touch: NT  - vibration (R/L, seconds): NT at the great toes  - pinprick: NT  - proprioception: NT  - Romberg: absent    COORDINATION:  - finger to nose: NT  - finger tapping: NT    REFLEXES:  Reflex Right Left   BR NT NT   Biceps NT NT   Triceps NT NT   Patellae NT NT   Achilles NT NT   Toes NT NT     GAIT:  - NT    REVIEW OF IMAGING STUDIES:  No data available.    REVIEW OF LABORATORY STUDIES:  No recent data available.    ASSESSMENT:  Arely Matt is a 37 y.o. woman with migraine without aura.  Plans/recommendations as follows:    PLAN:  Migraine w/o Aura:  Prevention:  - continue propranolol 160 mg/day  - get 7-9 hours of sleep per night; can try supplementing melatonin 2-10 mg, 2-3 hours before bedtime  - drink plenty of fluids (urine should be  nearly clear)  - avoid excessive caffeine intake (no more than 2 servings per day and nothing in the afternoon)  - eat regular meals (don't skip meals)  - get moderate exercise (even just a 20 minute walk daily)    Rescue:  - continue rizatriptan 10 mg PRN: take this at the onset of aura or headache pain; may re-dose x1 after 2 hours if headache persists; do not use more than 2 days/week  - do not use analgesics (e.g., ibuprofen, acetaminophen) more than 2 days per week in order to avoid analgesic rebound headaches    - keep a headache log    Follow-Up:  - Return if symptoms worsen or fail to improve.    Signed: Nimesh Lobo M.D.

## 2023-04-04 ENCOUNTER — OFFICE VISIT (OUTPATIENT)
Dept: MEDICAL GROUP | Facility: PHYSICIAN GROUP | Age: 38
End: 2023-04-04
Payer: COMMERCIAL

## 2023-04-04 VITALS
HEART RATE: 78 BPM | DIASTOLIC BLOOD PRESSURE: 84 MMHG | OXYGEN SATURATION: 98 % | BODY MASS INDEX: 31.4 KG/M2 | SYSTOLIC BLOOD PRESSURE: 126 MMHG | TEMPERATURE: 97 F | HEIGHT: 69 IN | WEIGHT: 212 LBS

## 2023-04-04 DIAGNOSIS — Z02.89 ENCOUNTER FOR COMPLETION OF FORM WITH PATIENT: ICD-10-CM

## 2023-04-04 PROCEDURE — 99212 OFFICE O/P EST SF 10 MIN: CPT | Performed by: NURSE PRACTITIONER

## 2023-04-04 ASSESSMENT — ENCOUNTER SYMPTOMS
PSYCHIATRIC NEGATIVE: 1
SHORTNESS OF BREATH: 0
FEVER: 0
COUGH: 0
CONSTITUTIONAL NEGATIVE: 1
EYES NEGATIVE: 1
SPUTUM PRODUCTION: 0
NEUROLOGICAL NEGATIVE: 1
GASTROINTESTINAL NEGATIVE: 1
PALPITATIONS: 0
MUSCULOSKELETAL NEGATIVE: 1

## 2023-04-04 NOTE — PROGRESS NOTES
"Subjective       CC:   Chief Complaint   Patient presents with    Paperwork        HPI:   Patient is a 37 y.o. established female patient with medical history listed below here today for clearance to participate as surrogate. She is doing well today. Her last pap was done 8/24/2022 and results were normal (NILM, -ve HPV, -ve GC/Chlam).     Patient Active Problem List   Diagnosis    Migraine without aura and without status migrainosus, not intractable    Excessive weight gain    Gastroesophageal reflux disease    History of postnasal drip    Obesity (BMI 30-39.9)    Intractable migraine without aura and with status migrainosus       Past Medical History:   Diagnosis Date    Migraine         History reviewed. No pertinent surgical history.     Current Outpatient Medications on File Prior to Visit   Medication Sig Dispense Refill    propranolol CR (INDERAL LA) 160 MG CAPSULE SR 24 HR capsule Take 1 Capsule by mouth every day. 90 Capsule 3    rizatriptan (MAXALT) 10 MG tablet Take 1 Tablet by mouth 1 time a day as needed for Migraine. 10 Tablet 11     No current facility-administered medications on file prior to visit.        ROS:  Review of Systems   Constitutional: Negative.  Negative for fever and malaise/fatigue.   HENT: Negative.     Eyes: Negative.    Respiratory:  Negative for cough, sputum production and shortness of breath.    Cardiovascular:  Negative for chest pain, palpitations and leg swelling.   Gastrointestinal: Negative.    Genitourinary: Negative.    Musculoskeletal: Negative.    Neurological: Negative.    Endo/Heme/Allergies: Negative.    Psychiatric/Behavioral: Negative.       Objective       Exam:  /84   Pulse 78   Temp 36.1 °C (97 °F) (Temporal)   Ht 1.753 m (5' 9\")   Wt 96.2 kg (212 lb)   SpO2 98%   BMI 31.31 kg/m²  Body mass index is 31.31 kg/m².    Physical Exam  Constitutional:       Appearance: Normal appearance.   Neurological:      General: No focal deficit present.      Mental " Status: She is alert and oriented to person, place, and time.   Psychiatric:         Mood and Affect: Mood normal.         Behavior: Behavior normal.         Thought Content: Thought content normal.         Judgment: Judgment normal.        Latest Reference Range & Units 08/24/22 16:55   HPV Genotype 16 Negative  Negative   HPV Genotype 18 Negative  Negative   HPV Other High Risk Genotypes Negative  Negative   Source  Cervical   PATHOLOGY GYNECOLOGY SPECIMEN  Rpt   Cytology Reg  See Path Report   C. trachomatis by PCR Negative  Negative   N. gonorrhoeae by PCR Negative  Negative   Source  Cervical     CERVICOVAGINAL CYTOLOGY REPORT   INTERPRETATION:   NEGATIVE FOR INTRAEPITHELIAL LESION OR MALIGNANCY.   Assessment & Plan       37 y.o. female with the following -   1. Encounter for completion of form with patient  Healthy 36 y/o female here with medical clearance forms to participate as surrogate. Normal PAP 8/24/2023. She is on daily propanolol for migraine prophylaxis. Otherwise she has no complaints or issues. Forms reviewed and completed today.   Encouraged healthy eating, staying active. Avoid fetal toxins (alcohol, street drugs, nicotine, smoke exposure).     Return if symptoms worsen or fail to improve.    Please note that this dictation was created using voice recognition software. I have made every reasonable attempt to correct obvious errors, but I expect that there are errors of grammar and possibly content that I did not discover before finalizing the note.

## 2023-06-16 ENCOUNTER — OFFICE VISIT (OUTPATIENT)
Dept: MEDICAL GROUP | Facility: PHYSICIAN GROUP | Age: 38
End: 2023-06-16
Payer: COMMERCIAL

## 2023-06-16 ENCOUNTER — TELEPHONE (OUTPATIENT)
Dept: ADMISSIONS | Facility: MEDICAL CENTER | Age: 38
End: 2023-06-16

## 2023-06-16 VITALS
OXYGEN SATURATION: 96 % | DIASTOLIC BLOOD PRESSURE: 64 MMHG | SYSTOLIC BLOOD PRESSURE: 102 MMHG | HEART RATE: 60 BPM | TEMPERATURE: 97.5 F | RESPIRATION RATE: 16 BRPM | HEIGHT: 69 IN | BODY MASS INDEX: 30.96 KG/M2 | WEIGHT: 209 LBS

## 2023-06-16 DIAGNOSIS — Z30.432 ENCOUNTER FOR IUD REMOVAL: ICD-10-CM

## 2023-06-16 PROCEDURE — 3074F SYST BP LT 130 MM HG: CPT | Performed by: NURSE PRACTITIONER

## 2023-06-16 PROCEDURE — 58301 REMOVE INTRAUTERINE DEVICE: CPT | Performed by: NURSE PRACTITIONER

## 2023-06-16 PROCEDURE — 3078F DIAST BP <80 MM HG: CPT | Performed by: NURSE PRACTITIONER

## 2023-06-16 RX ORDER — RIZATRIPTAN BENZOATE 10 MG/1
10 TABLET ORAL
COMMUNITY
End: 2023-06-19 | Stop reason: SDUPTHER

## 2023-06-16 RX ORDER — PROPRANOLOL HYDROCHLORIDE 160 MG/1
160 CAPSULE, EXTENDED RELEASE ORAL
COMMUNITY
End: 2023-06-21 | Stop reason: SDUPTHER

## 2023-06-16 ASSESSMENT — ENCOUNTER SYMPTOMS
SHORTNESS OF BREATH: 0
SPUTUM PRODUCTION: 0
PALPITATIONS: 0
GASTROINTESTINAL NEGATIVE: 1
CONSTITUTIONAL NEGATIVE: 1
FEVER: 0
COUGH: 0

## 2023-06-16 NOTE — LETTER
June 16, 2023    To Whom It May Concern:         This is confirmation that Arely Matt attended her scheduled appointment with Christy Inman D.N.P. on 6/16/23. This letter is to confirm that I REMOVED her IUD today, no bleeding or complications.          If you have any questions please do not hesitate to call me at the phone number listed below.    Sincerely,          HARLEY MeadeP.  [phone]

## 2023-06-16 NOTE — TELEPHONE ENCOUNTER
Voicemail 6/16/23: Patient called to request refill on RX, patient attempted to put request through MyChart but could not find refill available.

## 2023-06-16 NOTE — PROCEDURES
IUD Intrauterine Device Removal Procedure Note    PRE-OP DIAGNOSIS: IUD device removal; no longer wishes to have LARC for contraception  POST-OP DIAGNOSIS: Same   PROCEDURE: IUD removal      PROCEDURE:   The speculum was placed and the IUD string visualized.  Using ringed  forceps, the IUD string was grasped and the device was removed without  difficulty.  Bleeding was minimal.    Followup: The patient tolerated the procedure well without  complications.  Standard post-procedure care is explained and return  precautions are given.

## 2023-06-16 NOTE — ASSESSMENT & PLAN NOTE
Mirena IUD inserted 7/2019  Removed today 6/16/2023; pt is planning for gestational surrogacy so is not wanting to continue contraception at this time  - see procedure notes

## 2023-06-16 NOTE — PROGRESS NOTES
"Subjective       CC:   Chief Complaint   Patient presents with    IUD Removal        HPI:   Patient is a 37 y.o. established female patient with medical history listed below here today for IUD removal. States she has matched with couple in [place] to be gestational surrogate carrier. Her fertility clinic in [place] has advised to get IUD removed and then plan will be to wait a few months for cycle to regulate.       Patient Active Problem List   Diagnosis    Migraine without aura and without status migrainosus, not intractable    Excessive weight gain    Gastroesophageal reflux disease    History of postnasal drip    Obesity (BMI 30-39.9)    Intractable migraine without aura and with status migrainosus    Encounter for IUD removal       Past Medical History:   Diagnosis Date    Migraine         History reviewed. No pertinent surgical history.     Current Outpatient Medications on File Prior to Visit   Medication Sig Dispense Refill    propranolol CR (INDERAL LA) 160 MG CAPSULE SR 24 HR capsule Take 160 mg by mouth.      rizatriptan (MAXALT) 10 MG tablet Take 10 mg by mouth one time as needed for Migraine.       No current facility-administered medications on file prior to visit.        ROS:  Review of Systems   Constitutional: Negative.  Negative for fever and malaise/fatigue.   Respiratory:  Negative for cough, sputum production and shortness of breath.    Cardiovascular:  Negative for chest pain, palpitations and leg swelling.   Gastrointestinal: Negative.    Genitourinary: Negative.      Objective       Exam:  /64   Pulse 60   Temp 36.4 °C (97.5 °F) (Temporal)   Resp 16   Ht 1.753 m (5' 9\")   Wt 94.8 kg (209 lb)   SpO2 96%   BMI 30.86 kg/m²  Body mass index is 30.86 kg/m².    Physical Exam  Genitourinary:     General: Normal vulva.      Vagina: Normal.      Cervix: Normal.      Comments: IUD strings visible prior to removal          Assessment & Plan       37 y.o. female with the following - "     Problem List Items Addressed This Visit       Encounter for IUD removal     Mirena IUD inserted 7/2019  Removed today 6/16/2023; pt is planning for gestational surrogacy so is not wanting to continue contraception at this time  - see procedure notes            Return if symptoms worsen or fail to improve.    Please note that this dictation was created using voice recognition software. I have made every reasonable attempt to correct obvious errors, but I expect that there are errors of grammar and possibly content that I did not discover before finalizing the note.

## 2023-06-19 DIAGNOSIS — G43.011 INTRACTABLE MIGRAINE WITHOUT AURA AND WITH STATUS MIGRAINOSUS: Primary | ICD-10-CM

## 2023-06-19 RX ORDER — RIZATRIPTAN BENZOATE 10 MG/1
10 TABLET ORAL
Qty: 10 TABLET | Refills: 11 | Status: SHIPPED | OUTPATIENT
Start: 2023-06-19 | End: 2023-07-19

## 2023-06-21 DIAGNOSIS — G43.011 INTRACTABLE MIGRAINE WITHOUT AURA AND WITH STATUS MIGRAINOSUS: Primary | ICD-10-CM

## 2023-06-21 RX ORDER — PROPRANOLOL HYDROCHLORIDE 160 MG/1
160 CAPSULE, EXTENDED RELEASE ORAL DAILY
Qty: 90 CAPSULE | Refills: 3 | Status: SHIPPED | OUTPATIENT
Start: 2023-06-21 | End: 2024-06-15

## 2023-06-21 NOTE — TELEPHONE ENCOUNTER
Received request via: Patient    Was the patient seen in the last year in this department? Yes    Does the patient have an active prescription (recently filled or refills available) for medication(s) requested? No    Does the patient have group home Plus and need 100 day supply (blood pressure, diabetes and cholesterol meds only)? Medication is not for cholesterol, blood pressure or diabetes

## 2023-08-18 ENCOUNTER — PHARMACY VISIT (OUTPATIENT)
Dept: PHARMACY | Facility: MEDICAL CENTER | Age: 38
End: 2023-08-18
Payer: COMMERCIAL

## 2023-08-18 PROCEDURE — RXMED WILLOW AMBULATORY MEDICATION CHARGE: Performed by: INTERNAL MEDICINE

## 2024-05-22 ENCOUNTER — DOCUMENTATION (OUTPATIENT)
Dept: HEALTH INFORMATION MANAGEMENT | Facility: OTHER | Age: 39
End: 2024-05-22
Payer: COMMERCIAL

## 2024-06-17 ENCOUNTER — TELEPHONE (OUTPATIENT)
Dept: HEALTH INFORMATION MANAGEMENT | Facility: OTHER | Age: 39
End: 2024-06-17

## 2024-10-21 ENCOUNTER — HOSPITAL ENCOUNTER (OUTPATIENT)
Dept: LAB | Facility: MEDICAL CENTER | Age: 39
End: 2024-10-21
Payer: COMMERCIAL

## 2024-10-21 ENCOUNTER — OFFICE VISIT (OUTPATIENT)
Dept: MEDICAL GROUP | Facility: PHYSICIAN GROUP | Age: 39
End: 2024-10-21
Payer: COMMERCIAL

## 2024-10-21 VITALS
DIASTOLIC BLOOD PRESSURE: 64 MMHG | OXYGEN SATURATION: 100 % | HEIGHT: 69 IN | WEIGHT: 210 LBS | BODY MASS INDEX: 31.1 KG/M2 | SYSTOLIC BLOOD PRESSURE: 114 MMHG | HEART RATE: 67 BPM | TEMPERATURE: 97.4 F | RESPIRATION RATE: 20 BRPM

## 2024-10-21 DIAGNOSIS — R22.31 ARM MASS, RIGHT: ICD-10-CM

## 2024-10-21 DIAGNOSIS — Z00.00 HEALTHCARE MAINTENANCE: ICD-10-CM

## 2024-10-21 DIAGNOSIS — Z30.430 ENCOUNTER FOR INSERTION OF INTRAUTERINE CONTRACEPTIVE DEVICE (IUD): ICD-10-CM

## 2024-10-21 DIAGNOSIS — R63.5 WEIGHT GAIN: ICD-10-CM

## 2024-10-21 DIAGNOSIS — Z23 NEED FOR VACCINATION: ICD-10-CM

## 2024-10-21 DIAGNOSIS — M25.511 ACUTE PAIN OF RIGHT SHOULDER: ICD-10-CM

## 2024-10-21 DIAGNOSIS — G43.011 INTRACTABLE MIGRAINE WITHOUT AURA AND WITH STATUS MIGRAINOSUS: ICD-10-CM

## 2024-10-21 DIAGNOSIS — Z30.09 FAMILY PLANNING: ICD-10-CM

## 2024-10-21 DIAGNOSIS — R53.83 FATIGUE, UNSPECIFIED TYPE: ICD-10-CM

## 2024-10-21 LAB
ALBUMIN SERPL BCP-MCNC: 4.2 G/DL (ref 3.2–4.9)
ALBUMIN/GLOB SERPL: 1.7 G/DL
ALP SERPL-CCNC: 42 U/L (ref 30–99)
ALT SERPL-CCNC: 12 U/L (ref 2–50)
ANION GAP SERPL CALC-SCNC: 11 MMOL/L (ref 7–16)
AST SERPL-CCNC: 14 U/L (ref 12–45)
BASOPHILS # BLD AUTO: 0.3 % (ref 0–1.8)
BASOPHILS # BLD: 0.02 K/UL (ref 0–0.12)
BILIRUB SERPL-MCNC: 0.3 MG/DL (ref 0.1–1.5)
BUN SERPL-MCNC: 13 MG/DL (ref 8–22)
CALCIUM ALBUM COR SERPL-MCNC: 9.4 MG/DL (ref 8.5–10.5)
CALCIUM SERPL-MCNC: 9.6 MG/DL (ref 8.5–10.5)
CHLORIDE SERPL-SCNC: 107 MMOL/L (ref 96–112)
CHOLEST SERPL-MCNC: 182 MG/DL (ref 100–199)
CO2 SERPL-SCNC: 23 MMOL/L (ref 20–33)
CREAT SERPL-MCNC: 0.59 MG/DL (ref 0.5–1.4)
EOSINOPHIL # BLD AUTO: 0.09 K/UL (ref 0–0.51)
EOSINOPHIL NFR BLD: 1.5 % (ref 0–6.9)
ERYTHROCYTE [DISTWIDTH] IN BLOOD BY AUTOMATED COUNT: 42.2 FL (ref 35.9–50)
GFR SERPLBLD CREATININE-BSD FMLA CKD-EPI: 119 ML/MIN/1.73 M 2
GLOBULIN SER CALC-MCNC: 2.5 G/DL (ref 1.9–3.5)
GLUCOSE SERPL-MCNC: 94 MG/DL (ref 65–99)
HCT VFR BLD AUTO: 42.8 % (ref 37–47)
HDLC SERPL-MCNC: 62 MG/DL
HGB BLD-MCNC: 14 G/DL (ref 12–16)
IMM GRANULOCYTES # BLD AUTO: 0 K/UL (ref 0–0.11)
IMM GRANULOCYTES NFR BLD AUTO: 0 % (ref 0–0.9)
LDLC SERPL CALC-MCNC: 110 MG/DL
LYMPHOCYTES # BLD AUTO: 1.6 K/UL (ref 1–4.8)
LYMPHOCYTES NFR BLD: 27.2 % (ref 22–41)
MCH RBC QN AUTO: 29.4 PG (ref 27–33)
MCHC RBC AUTO-ENTMCNC: 32.7 G/DL (ref 32.2–35.5)
MCV RBC AUTO: 89.7 FL (ref 81.4–97.8)
MONOCYTES # BLD AUTO: 0.31 K/UL (ref 0–0.85)
MONOCYTES NFR BLD AUTO: 5.3 % (ref 0–13.4)
NEUTROPHILS # BLD AUTO: 3.86 K/UL (ref 1.82–7.42)
NEUTROPHILS NFR BLD: 65.7 % (ref 44–72)
NRBC # BLD AUTO: 0 K/UL
NRBC BLD-RTO: 0 /100 WBC (ref 0–0.2)
PLATELET # BLD AUTO: 253 K/UL (ref 164–446)
PMV BLD AUTO: 10.9 FL (ref 9–12.9)
POTASSIUM SERPL-SCNC: 4.3 MMOL/L (ref 3.6–5.5)
PROT SERPL-MCNC: 6.7 G/DL (ref 6–8.2)
RBC # BLD AUTO: 4.77 M/UL (ref 4.2–5.4)
SODIUM SERPL-SCNC: 141 MMOL/L (ref 135–145)
T4 FREE SERPL-MCNC: 1.36 NG/DL (ref 0.93–1.7)
THYROPEROXIDASE AB SERPL-ACNC: <9 IU/ML (ref 0–9)
TRIGL SERPL-MCNC: 51 MG/DL (ref 0–149)
TSH SERPL-ACNC: 2.26 UIU/ML (ref 0.35–5.5)
WBC # BLD AUTO: 5.9 K/UL (ref 4.8–10.8)

## 2024-10-21 PROCEDURE — 84443 ASSAY THYROID STIM HORMONE: CPT

## 2024-10-21 PROCEDURE — 36415 COLL VENOUS BLD VENIPUNCTURE: CPT

## 2024-10-21 PROCEDURE — 85025 COMPLETE CBC W/AUTO DIFF WBC: CPT

## 2024-10-21 PROCEDURE — 3078F DIAST BP <80 MM HG: CPT

## 2024-10-21 PROCEDURE — 3074F SYST BP LT 130 MM HG: CPT

## 2024-10-21 PROCEDURE — 86376 MICROSOMAL ANTIBODY EACH: CPT

## 2024-10-21 PROCEDURE — 80061 LIPID PANEL: CPT

## 2024-10-21 PROCEDURE — 86800 THYROGLOBULIN ANTIBODY: CPT

## 2024-10-21 PROCEDURE — 80053 COMPREHEN METABOLIC PANEL: CPT

## 2024-10-21 PROCEDURE — 90656 IIV3 VACC NO PRSV 0.5 ML IM: CPT

## 2024-10-21 PROCEDURE — 99214 OFFICE O/P EST MOD 30 MIN: CPT | Mod: 25

## 2024-10-21 PROCEDURE — 90471 IMMUNIZATION ADMIN: CPT

## 2024-10-21 PROCEDURE — 84439 ASSAY OF FREE THYROXINE: CPT

## 2024-10-21 RX ORDER — RIZATRIPTAN BENZOATE 10 MG/1
10 TABLET ORAL
Qty: 10 TABLET | Refills: 11 | Status: SHIPPED | OUTPATIENT
Start: 2024-10-21 | End: 2024-11-20

## 2024-10-21 RX ORDER — CYCLOBENZAPRINE HCL 5 MG
5 TABLET ORAL 3 TIMES DAILY PRN
Qty: 30 TABLET | Refills: 0 | Status: SHIPPED | OUTPATIENT
Start: 2024-10-21

## 2024-10-21 ASSESSMENT — ENCOUNTER SYMPTOMS
HEADACHES: 0
VOMITING: 0
WEAKNESS: 0
NAUSEA: 0
CONSTIPATION: 0
WEIGHT LOSS: 0
CHILLS: 0
FEVER: 0
COUGH: 0
DIARRHEA: 0
ABDOMINAL PAIN: 0
BLURRED VISION: 0
MYALGIAS: 0
SHORTNESS OF BREATH: 0
DIZZINESS: 0

## 2024-10-21 ASSESSMENT — PATIENT HEALTH QUESTIONNAIRE - PHQ9: CLINICAL INTERPRETATION OF PHQ2 SCORE: 0

## 2024-10-23 LAB — THYROGLOB AB SERPL-ACNC: <0.9 IU/ML (ref 0–4)

## 2024-11-15 ENCOUNTER — APPOINTMENT (OUTPATIENT)
Dept: RADIOLOGY | Facility: MEDICAL CENTER | Age: 39
End: 2024-11-15
Payer: COMMERCIAL

## 2025-01-03 ENCOUNTER — HOSPITAL ENCOUNTER (OUTPATIENT)
Dept: RADIOLOGY | Facility: MEDICAL CENTER | Age: 40
End: 2025-01-03
Payer: COMMERCIAL

## 2025-01-03 DIAGNOSIS — M25.511 ACUTE PAIN OF RIGHT SHOULDER: ICD-10-CM

## 2025-01-03 DIAGNOSIS — R22.31 ARM MASS, RIGHT: ICD-10-CM

## 2025-01-03 PROCEDURE — 76882 US LMTD JT/FCL EVL NVASC XTR: CPT | Mod: RT

## 2025-01-03 PROCEDURE — 73030 X-RAY EXAM OF SHOULDER: CPT | Mod: RT

## 2025-01-06 ENCOUNTER — APPOINTMENT (OUTPATIENT)
Dept: SPORTS MEDICINE | Facility: OTHER | Age: 40
End: 2025-01-06
Payer: COMMERCIAL

## 2025-01-06 VITALS
HEART RATE: 88 BPM | HEIGHT: 69 IN | RESPIRATION RATE: 18 BRPM | SYSTOLIC BLOOD PRESSURE: 122 MMHG | OXYGEN SATURATION: 97 % | BODY MASS INDEX: 31.1 KG/M2 | DIASTOLIC BLOOD PRESSURE: 80 MMHG | TEMPERATURE: 98.6 F | WEIGHT: 210 LBS

## 2025-01-06 DIAGNOSIS — M75.41 SHOULDER IMPINGEMENT SYNDROME, RIGHT: ICD-10-CM

## 2025-01-06 DIAGNOSIS — M75.81 ROTATOR CUFF TENDINITIS, RIGHT: ICD-10-CM

## 2025-01-06 PROCEDURE — 3079F DIAST BP 80-89 MM HG: CPT | Performed by: FAMILY MEDICINE

## 2025-01-06 PROCEDURE — 99214 OFFICE O/P EST MOD 30 MIN: CPT | Performed by: FAMILY MEDICINE

## 2025-01-06 PROCEDURE — 3074F SYST BP LT 130 MM HG: CPT | Performed by: FAMILY MEDICINE

## 2025-01-06 ASSESSMENT — FIBROSIS 4 INDEX: FIB4 SCORE: 0.59

## 2025-01-06 NOTE — PROGRESS NOTES
Chief Complaint   Patient presents with    Shoulder Pain     R shoulder pain        CHIEF COMPLAINT:  Arely Matt female presenting at the request of Ender Luevano D.N.P.  for evaluation of Shoulder pain.     Arely Matt is complaining of right shoulder pain (right-handed dominant)  present for 8 months (roughly March 2023)  No specific injury, but around the time her pain started she was doing a renovation at home which involves overhead activity including removing popcorn and doing drywall work and painting  Pain is at the deltoid region in a rotator cuff distribution  Quality is sharp  Pain is Non-radiating  Aggravated by movement, overhead activity, putting on a shirt, reaching for her seatbelt as well as pushing and pulling  No alleviating factors  Pain is 4 out of 10  No numbness, tingling or radicular symptoms  She denies any cervical spine pain  Improved with  rest   no prior problems with this shoulder in the past  Prior Treatments:  Seen by PCP in October 2024  Had x-rays, was referred to PT which she has scheduled to start in the end of January and was referred for further evaluation management  Prior studies: X-Ray   Medications tried for pain include: ibuprofen (OTC), icing with limited improvement/minimal  Mechanical Symptom history: No Locking and Popping which is not necessarily painful    IT/technology support/desk work    REVIEW OF SYSTEMS  No Nausea, No Vomiting, No Chest Pain, No Shortness of Breath, No Dizziness, No Headache    PAST MEDICAL HISTORY:   History reviewed. No pertinent past medical history.    PMH:  has a past medical history of Migraine.  MEDS:   Current Outpatient Medications:     rizatriptan (MAXALT) 10 MG tablet, Take 1 Tablet by mouth one time as needed for Migraine for up to 30 days., Disp: 10 Tablet, Rfl: 11    cyclobenzaprine (FLEXERIL) 5 mg tablet, Take 1 Tablet by mouth 3 times a day as needed for Moderate Pain (shoulder pain)., Disp: 30 Tablet, Rfl: 0  ALLERGIES:  "  Allergies   Allergen Reactions    Morphine Cough, Hives, Itching and Shortness of Breath     SURGHX:   Past Surgical History:   Procedure Laterality Date    WRIST ARTHROSCOPY Left     approx age 28     SOCHX:  reports that she has never smoked. She has never used smokeless tobacco. She reports current alcohol use. She reports that she does not use drugs.  FH: Family history was reviewed, no pertinent findings to report     PHYSICAL EXAM:  /80 (BP Location: Left arm, Patient Position: Sitting, BP Cuff Size: Adult)   Pulse 88   Temp 37 °C (98.6 °F) (Temporal)   Resp 18   Ht 1.753 m (5' 9\")   Wt 95.3 kg (210 lb)   SpO2 97%   BMI 31.01 kg/m²      well-developed, well-nourished in no apparent distress, alert and oriented x 3.  Gait: normal    Cervical spine:  Range of motion Intact  Spurling's testing is NEGATIVE  Cervical spine tenderness NEGATIVE    Strength testing:     Deltoid, bilateral 5/5  Bicep, bilateral 5/5  Tricep, bilateral 5/5  Wrist Extension, bilateral 5/5  Wrist Flexion, bilateral 5/5  Finger Abduction, bilateral 5/5    Sensation:  INTACT Bilaterally        Reflexes:   Biceps: R 2+/L 2+  Triceps: R 2+/L  2+  Brachial radialis R 2+/L  2+  Osorio's testing is NEGATIVE  The arms are otherwise neurovascularly intact     Shoulder Exam:    RIGHT Shoulder:  No visible swelling   Range of motion INTACT  Tenderness: Non-tender  Empty Can Testing 5/5 with pain  Internal Rotation 5/5  External Rotation 5/5 with pain  Lift Off Testing 5/5  Impingement testing Rowland  POSITIVE  Neer's testing POSITIVE    LEFT Shoulder:  No visible swelling   Range of motion INTACT  Tenderness: Non-tender  Empty Can Testing 5/5  Internal Rotation 5/5  External Rotation 5/5  Lift Off Testing 5/5  Impingement testing [place]  NEGATIVE  Neer's testing NEGATIVE    Additional Findings: None    1. Rotator cuff tendinitis, right        2. Shoulder impingement syndrome, right          present for 8 months (roughly March " 2023)  No specific injury, but around the time her pain started she was doing a renovation at home which involves overhead activity including removing popcorn and doing drywall work and painting    Provided with home exercise program  Pending formal physical therapy later this month    Return in about 4 weeks (around 2/3/2025).  If symptoms persist consider RIGHT subacromial corticosteroid injection at that time           1/3/2025 9:44 AM     HISTORY/REASON FOR EXAM:  Atraumatic Pain/Swelling/Deformity        TECHNIQUE/EXAM DESCRIPTION AND NUMBER OF VIEWS:  3 views of the RIGHT shoulder.     COMPARISON: None     FINDINGS:     No acute fracture or dislocation.  No joint osteoarthritis.        IMPRESSION:        1. No acute osseous abnormality.        Exam Ended: 01/03/25  9:44 AM Last Resulted: 01/03/25  8:10 PM     Interpreted in the office today with the patient    1/3/2025 9:30 AM     HISTORY/REASON FOR EXAM:  mass on right arm x4 months        TECHNIQUE/EXAM DESCRIPTION AND NUMBER OF VIEWS:  Limited ultrasound of the right upper extremity     COMPARISON: None     FINDINGS:  Focus ultrasound at the right upper arm demonstrates a 0.8 x 0.4 cm echogenic mass in the subcutaneous tissue.     IMPRESSION:     Likely a subcutaneous lipoma at the area of concern. The differential includes fat necrosis.        Exam Ended: 01/03/25  9:58 AM Last Resulted: 01/03/25  8:19 PM     Thank you Ender Luevano D.N.P. for allowing me to participate in caring for your patient.

## 2025-01-21 ENCOUNTER — PHYSICAL THERAPY (OUTPATIENT)
Dept: PHYSICAL THERAPY | Facility: REHABILITATION | Age: 40
End: 2025-01-21
Payer: COMMERCIAL

## 2025-01-21 DIAGNOSIS — M25.511 ACUTE PAIN OF RIGHT SHOULDER: ICD-10-CM

## 2025-01-21 PROCEDURE — 97162 PT EVAL MOD COMPLEX 30 MIN: CPT

## 2025-01-21 PROCEDURE — 97110 THERAPEUTIC EXERCISES: CPT

## 2025-01-21 ASSESSMENT — ENCOUNTER SYMPTOMS
PAIN SCALE AT LOWEST: 2
PAIN SCALE AT HIGHEST: 6
PAIN SCALE: 2
AWAKENINGS PER NIGHT: 1

## 2025-01-21 NOTE — OP THERAPY EVALUATION
Outpatient Physical Therapy  INITIAL EVALUATION    [facility] Physical Therapy [address].  [address]  [place] [address]  Phone:  [phone]  Fax:  [phone]    Date of Evaluation: 2025    Patient: Arely Matt  YOB: 1985  MRN: [id number]     Referring Provider: [address]  [place] [address]   Referring Diagnosis Acute pain of right shoulder [M25.511]     Time Calculation  Start time: 1547  Stop time: 1628 Time Calculation (min): 41 minutes         Chief Complaint: Shoulder Problem    Visit Diagnoses     ICD-10-CM   1. Acute pain of right shoulder  M25.511       Date of onset of impairment: 2024    Subjective:   History of Present Illness:     Mechanism of injury:  Pt is a 40 yo female presenting to PT with c/o R shoulder pain. Pt reports symptom onset in 2024 after home renovations. Describes symptoms as a sharp pain with certain movements. Otherwise is a dull ache on the lateral aspect of the shoulder, sometimes anterior shoulder. Denies any radiating pain, or NT. Has stopped house renovations d/t pain. Also enjoys gardening which was painful. Goal is to start again in the spring.     Aggs: reaching bwd, pushing up from a chair, lifting blankets in bed, reaching for seatbelt  Easers: rest  Irritability: moderate to high (30 minutes to 1-2 day)   Sleep disturbance:  Interrupted sleep  # Times/Night awakened:  1  Pain:     Current pain ratin    At best pain ratin    At worst pain ratin  Hand dominance:  Right  Activities of Daily Living:     Patient reported ADL status: Reports pain undressing, taking off bra. Reports difficulty washing back, vacuuming  Patient Goals:     Patient goals for therapy:  Decreased pain and return to sport/leisure activities    Other patient goals:  Renovate house      Past Medical History:   Diagnosis Date    Migraine      Past Surgical History:   Procedure Laterality Date    WRIST  ARTHROSCOPY Left     approx age 28     Social History     Tobacco Use    Smoking status: Never    Smokeless tobacco: Never   Substance Use Topics    Alcohol use: Yes     Comment: occasional, 1 2 drinks per month     Family and Occupational History     Socioeconomic History    Marital status:      Spouse name: Not on file    Number of children: Not on file    Years of education: Not on file    Highest education level: Associate degree: academic program   Occupational History    Not on file       Objective     Palpation   Left   No palpable tenderness to the infraspinatus.     Right   No palpable tenderness to the anterior deltoid, supraspinatus and triceps.   Tenderness of the infraspinatus.     Tenderness     Right Shoulder  No tenderness in the AC joint, acromion, biceps tendon (proximal) and bicipital groove.     Active Range of Motion   Left Shoulder   Normal active range of motion    Right Shoulder   Normal active range of motion    Strength:      Left Shoulder   Planes of Motion   Flexion: 4+   Extension: 4+   Abduction: 5   External rotation at 0°: 5   External rotation at 90°: 4+   Internal rotation at 0°: 5   Internal rotation at 90°: 5     Right Shoulder   Planes of Motion   Flexion: 4 (apprehensive)   Extension: 5   Abduction: 5   External rotation at 0°: 4+ (5/10 pain)   External rotation at 90°: 4+   Internal rotation at 0°: 5   Internal rotation at 90°: 5     Tests     Right Shoulder   Positive Hawkin's, Neer's and painful arc.     Additional Tests Details  (+) ERRT R side        Therapeutic Exercises (CPT 69438):     1. LEONCIO w/ OTB, x10    2. HADDER box w/ OTB, to fatigue    3. PNF D2 w/ PTB, x10, OTB for HEP    4. Wall diana, painful      Therapeutic Exercise Summary: Access Code: [email or web address]  URL: [email or web address]  Date: 01/21/2025  Prepared by: Rolando Perdomo    Exercises  - Shoulder PNF D2 with Resistance  - 1 x daily - 7 x weekly - reps to fatigue hold  - HADDER box - 1 x daily -  7 x weekly - reps to fatigue hold      Time-based treatments/modalities:    Physical Therapy Timed Treatment Charges  Therapeutic exercise minutes (CPT 64823): 15 minutes      Assessment, Response and Plan:   Impairments: activity intolerance, impaired physical strength, lacks appropriate home exercise program and pain with function    Assessment details:  Pt is a 38 yo cis-female presenting to PT w/ clinical findings suggestive of R SAP/impingement. Pertinent clinical findings include painful shoulder ROM with strong, but painful resisted isometrics. Impairments are associated w/ difficulty completing ADLs and daily tasks. Pt-reported goals are to return to completing house renovations. The patient would benefit from skilled PT to address strength deficits in order to increase participation with daily activities. The patient states they understand and agree with the plan of care. HEP w/ handout was reviewed and provided to the pt.   Barriers to therapy:  None  Prognosis: good    Goals:   Short Term Goals:   1. Pt will be compliant with HEP 3-5x/ week for increased ROM, strength, reduction in pain symptoms  2. Pt will demo to push up from chair with R shoulder pain 2/10 or better for improved functional mobility  Short term goal time span:  2-4 weeks      Long Term Goals:    1. Pt will demo global R shoulder strength 5/5 in order to start home renovations  2. Pt will report ability to dress self without shoulder pain  3. Pt will report ability to complete 10 minutes vacuuming without R shoulder pain   Long term goal time span:  4-6 weeks    Plan:   Therapy options:  Physical therapy treatment to continue  Planned therapy interventions:  E Stim Unattended (CPT 42327), Manual Therapy (CPT 80591), Neuromuscular Re-education (CPT 42459), Self Care ADL Training (CPT 17483), Therapeutic Activities (CPT 05500) and Therapeutic Exercise (CPT 87975)  Frequency:  1x week  Duration in weeks:  8  Duration in visits:   8  Discussed with:  Patient      Functional Assessment Used  Quickdash General Total Score: 31.82     Referring provider co-signature:  I have reviewed this plan of care and my co-signature certifies the need for services.    Certification Period: 01/21/2025 to  03/25/25    Physician Signature: ________________________________ Date: ______________

## 2025-01-24 NOTE — PROGRESS NOTES
[facility]  NEW GYNECOLOGY VISIT    Chief Complaint  New Patient and Sterilization      Subjective     Subjective  Arely Matt is a 39 y.o. female  who presents today to discuss permanent sterilization. She's previously had two children and is content with the size of her family. She and her partner have discussed vasectomy, however he has significant anxiety, so they believe salpingectomy better option. Denies other concerns today.     Gynecology History and ROS  Current Sexual Activity: No  History of sexually transmitted diseases?  no  Abnormal discharge? No  Current Contraception:  none    Menstrual History  Patient's last menstrual period was 2025 (exact date).  Periods are regular monthly, bleed 2 days, very light   Clots or heavy flow: No  Dysmenorrhea: No  Intermenstrual bleeding/spotting: No  Significant pain with periods:No  Bothersome PMS symptoms: No  Significant Pelvic Pain: No    Pap History  Last pap smear:  NILM, HPV neg (2022)  History of moderate or severe dysplasia: No      Obstetric History  OB History    Para Term  AB Living   2 2 1 1   2   SAB IAB Ectopic Molar Multiple Live Births             2      # Outcome Date GA Lbr Abhinav/2nd Weight Sex Type Anes PTL Lv   2 Term 10/20/12 40w0d  7 lb 3.2 oz M Vag-Spont  N DENISSE   1  05 36w0d  5 lb 9.6 oz F Vag-Spont  N DENISSE       Past Medical History  Past Medical History:   Diagnosis Date    Migraine        Past Surgical History  Past Surgical History:   Procedure Laterality Date    WRIST ARTHROSCOPY Left     approx age 28       Social History  Social History     Socioeconomic History    Marital status:      Spouse name: Not on file    Number of children: Not on file    Years of education: Not on file    Highest education level: Associate degree: academic program   Occupational History    Not on file   Tobacco Use    Smoking status: Never    Smokeless tobacco: Never   Vaping Use    Vaping status:  Never Used   Substance and Sexual Activity    Alcohol use: Yes     Comment: occasional, 1 2 drinks per month    Drug use: Never    Sexual activity: Not Currently     Partners: Male     Birth control/protection: Abstinence   Other Topics Concern    Not on file   Social History Narrative    Not on file     Social Drivers of Health     Financial Resource Strain: Low Risk  (4/21/2022)    Overall Financial Resource Strain (CARDIA)     Difficulty of Paying Living Expenses: Not very hard   Food Insecurity: No Food Insecurity (4/21/2022)    Hunger Vital Sign     Worried About Running Out of Food in the Last Year: Never true     Ran Out of Food in the Last Year: Never true   Transportation Needs: No Transportation Needs (4/21/2022)    PRAPARE - Transportation     Lack of Transportation (Medical): No     Lack of Transportation (Non-Medical): No   Physical Activity: Insufficiently Active (4/21/2022)    Exercise Vital Sign     Days of Exercise per Week: 3 days     Minutes of Exercise per Session: 30 min   Stress: Stress Concern Present (4/21/2022)    [employer] [employer] - Occupational Stress Questionnaire     Feeling of Stress : Rather much   Social Connections: Socially Isolated (4/21/2022)    Social Connection and Isolation Panel [NHANES]     Frequency of Communication with Friends and Family: Never     Frequency of Social Gatherings with Friends and Family: Never     Attends [employer]al Services: Never     Active Member of Clubs or Organizations: No     Attends Club or Organization Meetings: Never     Marital Status:    Intimate Partner Violence: Not on file   Housing Stability: High Risk (4/21/2022)    Housing Stability Vital Sign     Unable to Pay for Housing in the Last Year: No     Number of Places Lived in the Last Year: 3     Unstable Housing in the Last Year: No       Family History  Family History   Problem Relation Age of Onset    Diabetes Mother     Hypertension Mother     Alcohol abuse  "Mother     Stroke Mother     Thyroid Mother     No Known Problems Father     Breast Cancer Maternal Grandmother        Home Medications  Current Outpatient Medications on File Prior to Visit   Medication Sig Dispense Refill    rizatriptan (MAXALT) 10 MG tablet Take 1 Tablet by mouth one time as needed for Migraine for up to 30 days. 10 Tablet 11    cyclobenzaprine (FLEXERIL) 5 mg tablet Take 1 Tablet by mouth 3 times a day as needed for Moderate Pain (shoulder pain). 30 Tablet 0     No current facility-administered medications on file prior to visit.       Allergies/Reactions  Allergies   Allergen Reactions    Morphine Cough, Hives, Itching and Shortness of Breath       ROS  Review of Systems   All other systems reviewed and are negative.      Objective     Physical Examination:  Vital Signs:   Vitals:    01/28/25 1033   BP: 125/81   BP Location: Right arm   Patient Position: Sitting   BP Cuff Size: Adult   Weight: 215 lb   Height: 5' 9\"     Body mass index is 31.75 kg/m².    Physical Exam  Vitals and nursing note reviewed.   Constitutional:       General: She is not in acute distress.     Appearance: Normal appearance. She is normal weight.   Eyes:      Conjunctiva/sclera: Conjunctivae normal.   Pulmonary:      Effort: Pulmonary effort is normal.   Abdominal:      General: There is no distension.      Palpations: Abdomen is soft. There is no mass.      Tenderness: There is no abdominal tenderness. There is no guarding or rebound.   Skin:     General: Skin is warm and dry.   Neurological:      General: No focal deficit present.      Mental Status: She is alert.   Psychiatric:         Mood and Affect: Mood normal.         Assessment   Arely Matt is a 39 y.o. female who presents today to discuss permanent sterilization.     #Desires permanent sterilization  - The risks, benefits, alternatives, and indications of permanent sterilization were also reviewed. I discussed that the bilateral salpingectomy or bilateral " tubal ligation is considered a permanent procedure and the patient will no longer be able to bear children following this procedure. Risk of regret in increased in circumstances of young age or low parity. I discussed equally effective and alternative forms of birth control to include pills, barrier methods, Depo-Provera, IUD or male sterilization.  I also discussed the risk of sterilization failure with the patient which is one in 200-300 procedures. We discussed that should the sterilization procedure fail, there is a risk for ectopic pregnancy which may require surgical intervention, or possible intrauterine pregnancy.  I will attempt to remove both tubes completely, but if this can not be accomplished safety due to adhesions or increased vascularity, a partial tubal ligation will be performed.   - The risks, benefits and alternatives of this procedure were discussed with the patient, including but not limited to infection, disfiguring scar, severe loss of blood, venous thrombosis, injury to bowel, bladder or ureter, injury to blood vessels, and rare chance of needing to convert to laparotomy to complete the surgery or any repair.  She was counseled on the permanent/irreversible nature of removal of fallopian tubes for sterilization, the risk of regret,  risk of failure to prevent pregnancy and ectopic pregnancy. She expressed understanding and desire to proceed.   - Message sent to surgery schedulers     Return: 2wk postop    Margo Benjamin M.D.

## 2025-01-28 ENCOUNTER — GYNECOLOGY VISIT (OUTPATIENT)
Dept: OBGYN | Facility: CLINIC | Age: 40
End: 2025-01-28
Payer: COMMERCIAL

## 2025-01-28 ENCOUNTER — PHYSICAL THERAPY (OUTPATIENT)
Dept: PHYSICAL THERAPY | Facility: REHABILITATION | Age: 40
End: 2025-01-28
Payer: COMMERCIAL

## 2025-01-28 VITALS
WEIGHT: 215 LBS | BODY MASS INDEX: 31.84 KG/M2 | HEIGHT: 69 IN | DIASTOLIC BLOOD PRESSURE: 81 MMHG | SYSTOLIC BLOOD PRESSURE: 125 MMHG

## 2025-01-28 DIAGNOSIS — Z30.09 STERILIZATION CONSULT: Primary | ICD-10-CM

## 2025-01-28 DIAGNOSIS — M25.511 ACUTE PAIN OF RIGHT SHOULDER: ICD-10-CM

## 2025-01-28 PROCEDURE — 99203 OFFICE O/P NEW LOW 30 MIN: CPT | Performed by: STUDENT IN AN ORGANIZED HEALTH CARE EDUCATION/TRAINING PROGRAM

## 2025-01-28 PROCEDURE — 3074F SYST BP LT 130 MM HG: CPT | Performed by: STUDENT IN AN ORGANIZED HEALTH CARE EDUCATION/TRAINING PROGRAM

## 2025-01-28 PROCEDURE — 97140 MANUAL THERAPY 1/> REGIONS: CPT

## 2025-01-28 PROCEDURE — 97014 ELECTRIC STIMULATION THERAPY: CPT

## 2025-01-28 PROCEDURE — 97110 THERAPEUTIC EXERCISES: CPT

## 2025-01-28 PROCEDURE — 3079F DIAST BP 80-89 MM HG: CPT | Performed by: STUDENT IN AN ORGANIZED HEALTH CARE EDUCATION/TRAINING PROGRAM

## 2025-01-28 ASSESSMENT — FIBROSIS 4 INDEX: FIB4 SCORE: 0.59

## 2025-01-28 NOTE — PROGRESS NOTES
Patient here for annual exam & BTL Consult   Last pap done/result:08/25/2022  LMP: 01/22/2025  BCM:None  Phone number:[phone]  Pharmacy verified

## 2025-01-28 NOTE — OP THERAPY DAILY TREATMENT
Outpatient Physical Therapy  DAILY TREATMENT     [facility] Physical Therapy [address].  [address]  [place] [address]  Phone:  [phone]  Fax:  [phone]    Date: 01/28/2025    Patient: Arely Matt  YOB: 1985  MRN: [id number]     Time Calculation    Start time: 0815  Stop time: 0915 Time Calculation (min): 60 minutes         Chief Complaint: Shoulder Problem    Visit #: 2    SUBJECTIVE:  Pt reports no changes since initial eval. Continues to have pain in the lateral aspect of the shoulder, currently 2/10.     OBJECTIVE:  Current objective measures:   Reproduction of referred symptoms to lateral shoulder with palpation to infraspinatus and subscap          Therapeutic Exercises (CPT 37952):     1. LEONCIO w/ OTB, x20, warm up    2. HADDER box w/ OTB, to fatigue    3. PNF D2 w/ OTB, not today, held from HEP    4. IR at 0 w/ purple TB, to fatigue, added to HEP    5. Rows w/ BTB, to fatigue, added to HEP    6. Extensions w/ BTB, to fatigue, added to HEP    7. SL ER punch, to fatigue    8. Wall slide with lift off -> w/ pillowcase, 2x10      Therapeutic Exercise Summary: Access Code: [email or web address]  URL: [email or web address]  Date: 01/28/2025  Prepared by: Rolando Perdomo    Exercises  - Shoulder External Rotation and Scapular Retraction with Resistance  - 1 x daily - 7 x weekly - 3 sets - 10 reps  - Shoulder Internal Rotation with Resistance  - 1 x daily - 7 x weekly - 3 sets - 10 reps  - Shoulder extension with resistance - Neutral  - 1 x daily - 7 x weekly - 3 sets - 10 reps  - Standing Shoulder Row with Anchored Resistance  - 1 x daily - 7 x weekly - 3 sets - 10 reps    Therapeutic Treatments and Modalities:     1. Manual Therapy (CPT 90452), STM R subscap, infraspinatus    2. E Stim Unattended (CPT 72586), IFC w/ MHP to R shoulder, x15'    Time-based treatments/modalities:    Physical Therapy Timed Treatment Charges  Manual therapy minutes (CPT 03720): 15  minutes  Therapeutic exercise minutes (CPT 01567): 26 minutes        ASSESSMENT:   Response to treatment: Progressed RTC and parascap strengthening exercises with focus on subscap and infra engagement. Pt tolerated well and without pain, but demo quick fatigue on R side compared to L. Pt will benefit from continued skilled therapy with focus on graded resistance exercises in order to return to PLOF.    PLAN/RECOMMENDATIONS:   Plan for treatment: therapy treatment to continue next visit.  Planned interventions for next visit: continue with current treatment.

## 2025-02-03 ENCOUNTER — OFFICE VISIT (OUTPATIENT)
Dept: SPORTS MEDICINE | Facility: OTHER | Age: 40
End: 2025-02-03
Payer: COMMERCIAL

## 2025-02-03 VITALS
HEART RATE: 93 BPM | TEMPERATURE: 98.3 F | WEIGHT: 215 LBS | SYSTOLIC BLOOD PRESSURE: 122 MMHG | DIASTOLIC BLOOD PRESSURE: 82 MMHG | RESPIRATION RATE: 16 BRPM | OXYGEN SATURATION: 97 % | HEIGHT: 69 IN | BODY MASS INDEX: 31.84 KG/M2

## 2025-02-03 DIAGNOSIS — M75.81 ROTATOR CUFF TENDINITIS, RIGHT: ICD-10-CM

## 2025-02-03 DIAGNOSIS — M75.41 SHOULDER IMPINGEMENT SYNDROME, RIGHT: ICD-10-CM

## 2025-02-03 PROCEDURE — 3074F SYST BP LT 130 MM HG: CPT | Performed by: FAMILY MEDICINE

## 2025-02-03 PROCEDURE — 99213 OFFICE O/P EST LOW 20 MIN: CPT | Performed by: FAMILY MEDICINE

## 2025-02-03 PROCEDURE — 3079F DIAST BP 80-89 MM HG: CPT | Performed by: FAMILY MEDICINE

## 2025-02-03 ASSESSMENT — FIBROSIS 4 INDEX: FIB4 SCORE: 0.59

## 2025-02-03 NOTE — PROGRESS NOTES
"Chief Complaint   Patient presents with    Shoulder Pain     R shoulder pain      CHIEF COMPLAINT:  Arely Matt female presenting at the request of Ender Luevano D.N.P.  for evaluation of Shoulder pain.     Arely Matt is complaining of right shoulder pain (right-handed dominant)  present for 8 months (roughly March 2023)  No specific injury, but around the time her pain started she was doing a renovation at home which involves overhead activity including removing popcorn and doing drywall work and painting  Pain is at the deltoid region in a rotator cuff distribution  Quality is sharp  Pain is Non-radiating  Aggravated by movement, overhead activity, putting on a shirt, reaching for her seatbelt as well as pushing and pulling  No alleviating factors  Pain is 4 out of 10  No numbness, tingling or radicular symptoms  She denies any cervical spine pain  Improved with  rest   no prior problems with this shoulder in the past  Prior Treatments: Seen by PCP in October 2024  Had x-rays, was referred to PT which she has scheduled to start in the end of January and was referred for further evaluation management  Prior studies: X-Ray   Medications tried for pain include: ibuprofen (OTC), icing with limited improvement/minimal  Mechanical Symptom history: No Locking and Popping which is not necessarily painful    UPDATE:  About 50% improved at this point  She just tried a few sessions of PT and her switched up her exercises a bit  Overall she seems to be improving    IT/technology support/desk work     PHYSICAL EXAM:  /82 (BP Location: Left arm, Patient Position: Sitting, BP Cuff Size: Adult)   Pulse 93   Temp 36.8 °C (98.3 °F) (Temporal)   Resp 16   Ht 1.753 m (5' 9\")   Wt 97.5 kg (215 lb)   LMP 01/22/2025 (Exact Date)   SpO2 97%   BMI 31.75 kg/m²      well-developed, well-nourished in no apparent distress, alert and oriented x 3.    Shoulder Exam:    RIGHT Shoulder:  No visible swelling   Range of " motion INTACT  Tenderness: Non-tender  Empty Can Testing 5/5 with mild pain  Internal Rotation 5/5  External Rotation 5/5 with mild pain  Lift Off Testing 5/5  Impingement testing [place]  POSITIVE  Neer's testing POSITIVE    LEFT Shoulder:  No visible swelling   Range of motion INTACT  Tenderness: Non-tender  Empty Can Testing 5/5  Internal Rotation 5/5  External Rotation 5/5  Lift Off Testing 5/5  Impingement testing [place]  NEGATIVE  Neer's testing NEGATIVE    Additional Findings: None    1. Rotator cuff tendinitis, right        2. Shoulder impingement syndrome, right          present since roughly March 2023  No specific injury, but around the time her pain started she was doing a renovation at home which involves overhead activity including removing popcorn and doing drywall work and painting    Continue exercise program and formal physical therapy     We discussed the option of subacromial corticosteroid injection  She would like to hold off at this point since she sees some improvement and would like to try to continue some more PT to see how she does    Return in about 4 weeks (around 3/3/2025).  If symptoms persist consider RIGHT subacromial corticosteroid injection at that time           1/3/2025 9:44 AM     HISTORY/REASON FOR EXAM:  Atraumatic Pain/Swelling/Deformity        TECHNIQUE/EXAM DESCRIPTION AND NUMBER OF VIEWS:  3 views of the RIGHT shoulder.     COMPARISON: None     FINDINGS:     No acute fracture or dislocation.  No joint osteoarthritis.        IMPRESSION:        1. No acute osseous abnormality.        Exam Ended: 01/03/25  9:44 AM Last Resulted: 01/03/25  8:10 PM       1/3/2025 9:30 AM     HISTORY/REASON FOR EXAM:  mass on right arm x4 months        TECHNIQUE/EXAM DESCRIPTION AND NUMBER OF VIEWS:  Limited ultrasound of the right upper extremity     COMPARISON: None     FINDINGS:  Focus ultrasound at the right upper arm demonstrates a 0.8 x 0.4 cm echogenic mass in the subcutaneous tissue.      IMPRESSION:     Likely a subcutaneous lipoma at the area of concern. The differential includes fat necrosis.        Exam Ended: 01/03/25  9:58 AM Last Resulted: 01/03/25  8:19 PM     Thank you Ender Luevano D.N.P. for allowing me to participate in caring for your patient.

## 2025-02-07 ENCOUNTER — PHYSICAL THERAPY (OUTPATIENT)
Dept: PHYSICAL THERAPY | Facility: REHABILITATION | Age: 40
End: 2025-02-07
Payer: COMMERCIAL

## 2025-02-07 DIAGNOSIS — M25.511 ACUTE PAIN OF RIGHT SHOULDER: ICD-10-CM

## 2025-02-07 PROCEDURE — 97110 THERAPEUTIC EXERCISES: CPT

## 2025-02-07 PROCEDURE — 97140 MANUAL THERAPY 1/> REGIONS: CPT

## 2025-02-07 PROCEDURE — 97014 ELECTRIC STIMULATION THERAPY: CPT

## 2025-02-07 NOTE — OP THERAPY DAILY TREATMENT
"  Outpatient Physical Therapy  DAILY TREATMENT     [facility] Physical Therapy [address].  [address]  [place] [address]  Phone:  [phone]  Fax:  [phone]    Date: 02/07/2025    Patient: Arely Matt  YOB: 1985  MRN: [id number]     Time Calculation    Start time: 0733  Stop time: 0835 Time Calculation (min): 62 minutes         Chief Complaint: Shoulder Problem    Visit #: 3    SUBJECTIVE:  Pt reports R shoulder is feeling better overall. This morning is 2/10 pain. Worst 6/10    Aggs: reaching bwd, pushing up from a chair, lifting blankets in bed, reaching for seatbelt     OBJECTIVE:  Current objective measures:           Therapeutic Exercises (CPT 65856):     1. SL ER punch, to fatigue    2. HADDER box w/ OTB, to fatigue    3. Abd iso at wall, 5x15\" hold    4. Wall slide with lift off -> w/ OTB, 2x10    5. Rows w/ BTB, 2x10    6. Extensions w/ BTB, 2x10    7. IR at 0 w/ purple TB, to fatigue, cueing eccentric control    8. Body blade - yellow, 3x15\", horizontal., vertical      Therapeutic Exercise Summary: Access Code: [email or web address]  URL: [email or web address]  Date: 01/28/2025  Prepared by: Rolando Perdomo    Exercises  - Shoulder External Rotation and Scapular Retraction with Resistance  - 1 x daily - 7 x weekly - 3 sets - 10 reps  - Shoulder Internal Rotation with Resistance  - 1 x daily - 7 x weekly - 3 sets - 10 reps  - Shoulder extension with resistance - Neutral  - 1 x daily - 7 x weekly - 3 sets - 10 reps  - Standing Shoulder Row with Anchored Resistance  - 1 x daily - 7 x weekly - 3 sets - 10 reps    Therapeutic Treatments and Modalities:     1. Manual Therapy (CPT 35520), R GHJ inf mob gd 3-4 at end-range in slight IR; STM R infraspinatus    2. E Stim Unattended (CPT 14845), IFC w/ MHP to R shoulder, x15'    Time-based treatments/modalities:    Physical Therapy Timed Treatment Charges  Manual therapy minutes (CPT 20114): 10 minutes  Therapeutic exercise minutes " (CPT 85455): 32 minutes        ASSESSMENT:   Response to treatment: Continued progression of RTC strengthening exercises. Pt tolerated exercises in neutral position well and without pain. Reported some discomfort with low irritability in elevated positions. Pt is progressing at this time and appropriate to cont PT.    PLAN/RECOMMENDATIONS:   Plan for treatment: therapy treatment to continue next visit.Add pushing-focused exercises  Planned interventions for next visit: continue with current treatment.

## 2025-02-11 ENCOUNTER — PHYSICAL THERAPY (OUTPATIENT)
Dept: PHYSICAL THERAPY | Facility: REHABILITATION | Age: 40
End: 2025-02-11
Payer: COMMERCIAL

## 2025-02-11 DIAGNOSIS — M25.511 ACUTE PAIN OF RIGHT SHOULDER: ICD-10-CM

## 2025-02-11 PROCEDURE — 97014 ELECTRIC STIMULATION THERAPY: CPT

## 2025-02-11 PROCEDURE — 97110 THERAPEUTIC EXERCISES: CPT

## 2025-02-11 PROCEDURE — 97140 MANUAL THERAPY 1/> REGIONS: CPT

## 2025-02-11 NOTE — OP THERAPY DAILY TREATMENT
"  Outpatient Physical Therapy  DAILY TREATMENT     [facility] Physical [address].  [address]  [place] [address]  Phone:  [phone]  Fax:  [phone]    Date: 02/11/2025    Patient: Arely Matt  YOB: 1985  MRN: [id number]     Time Calculation    Start time: 1331  Stop time: 1435 Time Calculation (min): 64 minutes         Chief Complaint: Shoulder Problem    Visit #: 4    SUBJECTIVE:  Pt reports no R shoulder pain in resting position, but continues to be painful when reaching OH (in IR position)    OBJECTIVE:  Current objective measures:           Therapeutic Exercises (CPT 26813):     1. SL ER punch 0->1#, 2x fatigue    2. HADDER box w/ OTB, reviewed    3. Abd iso at wall, 2x5x15\" hold    4. Wall slide with lift off w/ OTB, to fatigue    5. TRX rows, to fatigue, cueing scap retraction    6. Extensions w/ BTB, x10    7. IR at 0 w/ purple TB, to fatigue, cueing eccentric control    8. Body blade - yellow, 3x15\", horizontal., vertical    10. SL abd w/ OTB, 2x fatigue    11. SB 3 way AAROM, x2'      Therapeutic Exercise Summary: Access Code: [email or web address]  URL: [email or web address]  Date: 02/11/2025  Prepared by: Rolando Perdomo    Exercises  - Shoulder External Rotation and Scapular Retraction with Resistance  - 1 x daily - 7 x weekly - 3 sets - 10 reps  - Shoulder Internal Rotation with Resistance  - 1 x daily - 7 x weekly - 3 sets - 10 reps  - Shoulder extension with resistance - Neutral  - 1 x daily - 7 x weekly - 3 sets - 10 reps  - Standing Shoulder Row with Anchored Resistance  - 1 x daily - 7 x weekly - 3 sets - 10 reps  - Isometric Shoulder Abduction at Wall  - 5 x daily - 7 x weekly - 5-10 reps - 15 seconds hold  - Sidelying Full Range Shoulder Abduction with Resistance  - 1 x daily - 7 x weekly - reps to fatigue hold    Therapeutic Treatments and Modalities:     1. Manual Therapy (CPT 33493), R GHJ inf mob gd 3-4 at end-range in slight IR; STM R " infraspinatus    2. E Stim Unattended (CPT 29798), IFC w/ MHP to R shoulder, x15'    Time-based treatments/modalities:    Physical Therapy Timed Treatment Charges  Manual therapy minutes (CPT 63488): 15 minutes  Therapeutic exercise minutes (CPT 38993): 26 minutes      ASSESSMENT:   Response to treatment: Continued focus on graded strengthening program. Pt demo improved activity tolerance, was able to increase resistance/difficulty. Pt continues to demo quick fatigue and reports discomfort in elevated positions once fatigued. Pt is progressing at this time and appropriate to cont PT.    PLAN/RECOMMENDATIONS:   Plan for treatment: therapy treatment to continue next visit.  Planned interventions for next visit: continue with current treatment.

## 2025-02-18 ENCOUNTER — APPOINTMENT (OUTPATIENT)
Dept: PHYSICAL THERAPY | Facility: REHABILITATION | Age: 40
End: 2025-02-18
Payer: COMMERCIAL

## 2025-02-21 ENCOUNTER — PHYSICAL THERAPY (OUTPATIENT)
Dept: PHYSICAL THERAPY | Facility: REHABILITATION | Age: 40
End: 2025-02-21
Payer: COMMERCIAL

## 2025-02-21 DIAGNOSIS — M25.511 ACUTE PAIN OF RIGHT SHOULDER: ICD-10-CM

## 2025-02-21 PROCEDURE — 97110 THERAPEUTIC EXERCISES: CPT

## 2025-02-21 PROCEDURE — 97112 NEUROMUSCULAR REEDUCATION: CPT

## 2025-02-21 PROCEDURE — 97014 ELECTRIC STIMULATION THERAPY: CPT

## 2025-02-21 NOTE — OP THERAPY DAILY TREATMENT
Outpatient Physical Therapy  DAILY TREATMENT     [facility] Physical [address].  [address]  [place] [address]  Phone:  [phone]  Fax:  [phone]    Date: 02/21/2025    Patient: Arely Matt  YOB: 1985  MRN: [id number]     Time Calculation    Start time: 0816  Stop time: 0930 Time Calculation (min): 74 minutes         Chief Complaint: Shoulder Problem    Visit #: 5    SUBJECTIVE:  Pt reports R shoulder pain is improving. Currently no pain at rest, but sometimes is when aggravated    Aggs: abduction, pushing a basketball  Irritability: low to moderate    Current pain: 0/10 (in resting position)  Worst pain: 4/10    OBJECTIVE:  Current objective measures: see progress note  Quickdash General Total Score: 15.91       Therapeutic Exercises (CPT 49060):     1. W raises, to fatigue    2. SL ER punch 1#, to fatigue    4. Wall slide with lift off w/ OTB, not today    5. TRX rows, not today, cueing scap retraction    6. Extensions w/ BTB, not today    7. IR at 0 w/ purple TB, not today, cueing eccentric control    8. Body blade - yellow, not today, horizontal., vertical    10. SL abd w/ OTB, 2x fatigue    14. Objective measurements      Therapeutic Exercise Summary: Access Code: [email or web address]  URL: [email or web address]  Date: 02/11/2025  Prepared by: Rolando Perdomo    Exercises  - Shoulder External Rotation and Scapular Retraction with Resistance  - 1 x daily - 7 x weekly - 3 sets - 10 reps  - Shoulder Internal Rotation with Resistance  - 1 x daily - 7 x weekly - 3 sets - 10 reps  - Shoulder extension with resistance - Neutral  - 1 x daily - 7 x weekly - 3 sets - 10 reps  - Standing Shoulder Row with Anchored Resistance  - 1 x daily - 7 x weekly - 3 sets - 10 reps  - Isometric Shoulder Abduction at Wall  - 5 x daily - 7 x weekly - 5-10 reps - 15 seconds hold  - Sidelying Full Range Shoulder Abduction with Resistance  - 1 x daily - 7 x weekly - reps to fatigue  hold    Therapeutic Treatments and Modalities:     1. Manual Therapy (CPT 93350), see summary    2. E Stim Unattended (CPT 29714), IFC w/ MHP to R shoulder, x15'    Therapeutic Treatment and Modalities Summary: Manual:  STM/ART R infraspinatus;    DN: Patient signed informed written release and verbally agreed with informed consent to procedure of dry needling   skin prep with isopropyl alcohol prep  -R infraspinatus, x4  -Estim w/ FDN, pw 80, 4 Hz - x 10'  -Was able to reproduce lateral shoulder pain  -No adverse reactions observed post treatment     Time-based treatments/modalities:    Physical Therapy Timed Treatment Charges  Neuromusc re-ed, balance, coor, post minutes (CPT 00291): 18 minutes  Therapeutic exercise minutes (CPT 01796): 23 minutes      ASSESSMENT:   Response to treatment: see progress note    PLAN/RECOMMENDATIONS:   Plan for treatment: therapy treatment to continue next visit.  Planned interventions for next visit: continue with current treatment.

## 2025-02-21 NOTE — OP THERAPY PROGRESS SUMMARY
Outpatient Physical Therapy  PROGRESS SUMMARY NOTE      [facility] Physical Therapy [address].  [address]  [place] [address]  Phone:  [phone]  Fax:  [phone]    Date of Visit: 02/21/2025    Patient: Arely Matt  YOB: 1985  MRN: [id number]     Referring Provider: Ender Luevano D.N.P.  [address]  [place] [address]   Referring Diagnosis Pain in right shoulder [M25.511]     Visit Diagnoses     ICD-10-CM   1. Acute pain of right shoulder  M25.511       Rehab Potential: good    Progress Report Period: 1/21/25 to 2/21/25    Functional Assessment Used  Quickdash General Total Score: 15.91       Objective Findings and Assessment:   Patient progression towards goals: Pt has attended 6 visits from 1/21/25 to 2/21/25 and has demonstrated overall functional improvements. Demo improved RTC strength, but continues to be limited compared to the L side. Demo shoulder ROM WNL. Impairments continue to include difficulty performing work and daily tasks. The patient will benefit from continued skilled therapy with focus on strength deficits in order to increase participation with daily tasks. The pt states she understands and agrees to the POC.     Objective findings and assessment details: Shoulder MMT:  Flexion: L 4+, R 4+  Extension: L 5, R 5  Abduction: L 5, R 5 (3/10 pain)  ER at 0°: L 5, R 4+ (3/10 pain)   ER at 90°: L 5, R 5 (3/10 pain)  IR at 0°: L 5, R 5  IR at 90°: L 5, R 5    ROM:   Flexion: 4/10 pain at end-range  Abd: 4/10 pain at end-range  BTB: 2/10 pain at end-range    Goals:   Short Term Goals:   1. Pt will be compliant with HEP 3-5x/ week for increased ROM, strength, reduction in pain symptoms - met  2. Pt will demo to push up from chair with R shoulder pain 2/10 or better for improved functional mobility - not met, still 4/10 pain    Short term goal time span:  2-4 weeks      Long Term Goals:    1. Pt will demo global R shoulder strength 5/5 in order to start  home renovations  2. Pt will report ability to dress self without shoulder pain - met  3. Pt will report ability to complete 10 minutes vacuuming without R shoulder pain - met    Long term goal time span:  4-6 weeks    Plan:   Planned therapy interventions:  E Stim Unattended (CPT 50361), Manual Therapy (CPT 81463), Neuromuscular Re-education (CPT 24937), Self Care ADL Training (CPT 69071), Therapeutic Activities (CPT 32255) and Therapeutic Exercise (CPT 40883)  Frequency:  1x week  Duration in weeks:  6  Duration in visits:  6      Referring provider co-signature:  I have reviewed this plan of care and my co-signature certifies the need for services.     Certification Period: 02/21/2025 to 04/11/25    Physician Signature: ________________________________ Date: ______________

## 2025-02-25 ENCOUNTER — APPOINTMENT (OUTPATIENT)
Dept: PHYSICAL THERAPY | Facility: REHABILITATION | Age: 40
End: 2025-02-25
Payer: COMMERCIAL

## 2025-02-28 ENCOUNTER — PHYSICAL THERAPY (OUTPATIENT)
Dept: PHYSICAL THERAPY | Facility: REHABILITATION | Age: 40
End: 2025-02-28
Payer: COMMERCIAL

## 2025-02-28 DIAGNOSIS — M25.511 ACUTE PAIN OF RIGHT SHOULDER: ICD-10-CM

## 2025-02-28 PROCEDURE — 97140 MANUAL THERAPY 1/> REGIONS: CPT

## 2025-02-28 PROCEDURE — 97110 THERAPEUTIC EXERCISES: CPT

## 2025-02-28 PROCEDURE — 97014 ELECTRIC STIMULATION THERAPY: CPT

## 2025-02-28 NOTE — OP THERAPY DAILY TREATMENT
"  Outpatient Physical Therapy  DAILY TREATMENT     [facility] Physical [address].  [address]  [place] [address]  Phone:  [phone]  Fax:  [phone]    Date: 02/28/2025    Patient: Arely Matt  YOB: 1985  MRN: [id number]     Time Calculation    Start time: 0815  Stop time: 0930 Time Calculation (min): 75 minutes         Chief Complaint: Shoulder Problem    Visit #: 6    SUBJECTIVE:  Pt reports no shoulder pain in the morning.    Aggs: putting on a backpack    OBJECTIVE:  Current objective measures:           Therapeutic Exercises (CPT 62086):     1. LEONCIO, x15 warm-up    2. W raises, to fatigue    3. SL ER punch 1#, to fatigue    4. Wall slide with lift off w/ OTB, to fatigue (10 reps)    5. TRX rows, 2x15, cueing scap retraction    8. Body blade - yellow (SL), 2x20\", horizontal., vertical    9. Body blade PNF, 2x fatigue    10. Resisted diana w/ PTB, to fatigue      Therapeutic Exercise Summary: Access Code: [email or web address]  URL: [email or web address]  Date: 02/11/2025  Prepared by: Rolando Perdomo    Exercises  - Shoulder External Rotation and Scapular Retraction with Resistance  - 1 x daily - 7 x weekly - 3 sets - 10 reps  - Shoulder Internal Rotation with Resistance  - 1 x daily - 7 x weekly - 3 sets - 10 reps  - Shoulder extension with resistance - Neutral  - 1 x daily - 7 x weekly - 3 sets - 10 reps  - Standing Shoulder Row with Anchored Resistance  - 1 x daily - 7 x weekly - 3 sets - 10 reps  - Isometric Shoulder Abduction at Wall  - 5 x daily - 7 x weekly - 5-10 reps - 15 seconds hold  - Sidelying Full Range Shoulder Abduction with Resistance  - 1 x daily - 7 x weekly - reps to fatigue hold    Therapeutic Treatments and Modalities:     1. Manual Therapy (CPT 45504), see summary    2. E Stim Unattended (CPT 18968), IFC w/ MHP to R shoulder, x15'    Therapeutic Treatment and Modalities Summary: Manual:  STM/ART R infraspinatus;    DN: Patient signed informed " written release and verbally agreed with informed consent to procedure of dry needling   skin prep with isopropyl alcohol prep  -R infraspinatus, x4  -Estim w/ FDN, pw 80, 4 Hz - x 10'  -Was able to reproduce lateral shoulder pain  -No adverse reactions observed post treatment     Time-based treatments/modalities:    Physical Therapy Timed Treatment Charges  Manual therapy minutes (CPT 25318): 15 minutes  Therapeutic exercise minutes (CPT 55599): 27 minutes      ASSESSMENT:   Response to treatment: Pt continues to demo positive response to increased resistance exercises and FDN. Demo overall decreased irritability. Was able to reproduce anterior shoulder pain during FDN. Pt is progressing at this time and is appropriate to cont PT.    PLAN/RECOMMENDATIONS:   Plan for treatment: therapy treatment to continue next visit.  Planned interventions for next visit: continue with current treatment.

## 2025-03-03 ENCOUNTER — APPOINTMENT (OUTPATIENT)
Dept: SPORTS MEDICINE | Facility: OTHER | Age: 40
End: 2025-03-03
Payer: COMMERCIAL

## 2025-03-03 VITALS
HEIGHT: 69 IN | SYSTOLIC BLOOD PRESSURE: 118 MMHG | TEMPERATURE: 98.1 F | OXYGEN SATURATION: 97 % | DIASTOLIC BLOOD PRESSURE: 76 MMHG | RESPIRATION RATE: 16 BRPM | WEIGHT: 215 LBS | HEART RATE: 78 BPM | BODY MASS INDEX: 31.84 KG/M2

## 2025-03-03 DIAGNOSIS — M75.41 SHOULDER IMPINGEMENT SYNDROME, RIGHT: ICD-10-CM

## 2025-03-03 DIAGNOSIS — M75.81 ROTATOR CUFF TENDINITIS, RIGHT: ICD-10-CM

## 2025-03-03 PROCEDURE — 3074F SYST BP LT 130 MM HG: CPT | Performed by: FAMILY MEDICINE

## 2025-03-03 PROCEDURE — 99214 OFFICE O/P EST MOD 30 MIN: CPT | Performed by: FAMILY MEDICINE

## 2025-03-03 PROCEDURE — 3078F DIAST BP <80 MM HG: CPT | Performed by: FAMILY MEDICINE

## 2025-03-03 RX ORDER — TRIAMCINOLONE ACETONIDE 40 MG/ML
40 INJECTION, SUSPENSION INTRA-ARTICULAR; INTRAMUSCULAR ONCE
Status: COMPLETED | OUTPATIENT
Start: 2025-03-03 | End: 2025-03-03

## 2025-03-03 RX ADMIN — TRIAMCINOLONE ACETONIDE 40 MG: 40 INJECTION, SUSPENSION INTRA-ARTICULAR; INTRAMUSCULAR at 15:53

## 2025-03-03 ASSESSMENT — FIBROSIS 4 INDEX: FIB4 SCORE: 0.59

## 2025-03-03 NOTE — PROGRESS NOTES
"Chief Complaint   Patient presents with    Shoulder Pain     R shoulder pain      CHIEF COMPLAINT:  Arely Matt female presenting at the request of Ender Luevano D.N.P.  for evaluation of Shoulder pain.     Arely Matt is complaining of right shoulder pain (right-handed dominant)  present for 8 months (roughly March 2023)  No specific injury, but around the time her pain started she was doing a renovation at home which involves overhead activity including removing popcorn and doing drywall work and painting  Pain is at the deltoid region in a rotator cuff distribution  Quality is sharp  Pain is Non-radiating  Aggravated by movement, overhead activity, putting on a shirt, reaching for her seatbelt as well as pushing and pulling  No alleviating factors  Pain is 4 out of 10  No numbness, tingling or radicular symptoms  She denies any cervical spine pain  Improved with  rest   no prior problems with this shoulder in the past  Prior Treatments: Seen by PCP in October 2024  Had x-rays, was referred to PT which she has scheduled to start in the end of January and was referred for further evaluation management  Prior studies: X-Ray   Medications tried for pain include: ibuprofen (OTC), icing with limited improvement/minimal  Mechanical Symptom history: No Locking and Popping which is not necessarily painful    UPDATE:  Improved at this point, but still struggling  She has been doing PT but she has plateaued  She is interested in corticosteroid injection    IT/technology support/desk work     PHYSICAL EXAM:  /76 (BP Location: Left arm, Patient Position: Sitting, BP Cuff Size: Adult)   Pulse 78   Temp 36.7 °C (98.1 °F) (Temporal)   Resp 16   Ht 1.753 m (5' 9\")   Wt 97.5 kg (215 lb)   SpO2 97%   BMI 31.75 kg/m²     well-developed, well-nourished in no apparent distress, alert and oriented x 3.    Shoulder Exam:    RIGHT Shoulder:  No visible swelling   Range of motion INTACT  Tenderness: Non-tender  Empty " Can Testing 5/5 with mild pain  Internal Rotation 5/5  External Rotation 5/5 with mild pain  Lift Off Testing 5/5  Impingement testing [place]  POSITIVE  Neer's testing POSITIVE    LEFT Shoulder:  No visible swelling   Range of motion INTACT  Tenderness: Non-tender  Empty Can Testing 5/5  Internal Rotation 5/5  External Rotation 5/5  Lift Off Testing 5/5  Impingement testing [place]  NEGATIVE  Neer's testing NEGATIVE    Additional Findings: None    1. Rotator cuff tendinitis, right  triamcinolone acetonide (Kenalog-40) injection 40 mg      2. Shoulder impingement syndrome, right  triamcinolone acetonide (Kenalog-40) injection 40 mg        present since roughly March 2023  No specific injury, but around the time her pain started she was doing a renovation at home which involves overhead activity including removing popcorn and doing drywall work and painting    Immediate improvement with formal physical therapy     RIGHT subacromial corticosteroid injection performed the office TODAY (March 3, 2025)    PROCEDURE NOTE:  right Shoulder subacromial injection  Risks and benefits discussed  Informed consent obtained  Shoulder prepped in sterile fashion utilizing a posterior approach  40 mg of Kenalog and 5 cc of bupivacaine injected into the subacromial space  Vapocoolant spray was utilized  Patient tolerated the procedure well  Postprocedure care and red flags discussed    Return in about 4 weeks (around 3/31/2025).  To see how she is doing after RIGHT subacromial corticosteroid injection           1/3/2025 9:44 AM     HISTORY/REASON FOR EXAM:  Atraumatic Pain/Swelling/Deformity        TECHNIQUE/EXAM DESCRIPTION AND NUMBER OF VIEWS:  3 views of the RIGHT shoulder.     COMPARISON: None     FINDINGS:     No acute fracture or dislocation.  No joint osteoarthritis.        IMPRESSION:        1. No acute osseous abnormality.        Exam Ended: 01/03/25  9:44 AM Last Resulted: 01/03/25  8:10 PM       1/3/2025 9:30 AM      HISTORY/REASON FOR EXAM:  mass on right arm x4 months        TECHNIQUE/EXAM DESCRIPTION AND NUMBER OF VIEWS:  Limited ultrasound of the right upper extremity     COMPARISON: None     FINDINGS:  Focus ultrasound at the right upper arm demonstrates a 0.8 x 0.4 cm echogenic mass in the subcutaneous tissue.     IMPRESSION:     Likely a subcutaneous lipoma at the area of concern. The differential includes fat necrosis.        Exam Ended: 01/03/25  9:58 AM Last Resulted: 01/03/25  8:19 PM     Thank you LEXUS Padilla.N.P. for allowing me to participate in caring for your patient.

## 2025-03-04 ENCOUNTER — APPOINTMENT (OUTPATIENT)
Dept: PHYSICAL THERAPY | Facility: REHABILITATION | Age: 40
End: 2025-03-04
Payer: COMMERCIAL

## 2025-03-05 ENCOUNTER — HOSPITAL ENCOUNTER (OUTPATIENT)
Dept: LAB | Facility: MEDICAL CENTER | Age: 40
End: 2025-03-05
Payer: COMMERCIAL

## 2025-03-05 ENCOUNTER — APPOINTMENT (OUTPATIENT)
Dept: MEDICAL GROUP | Facility: PHYSICIAN GROUP | Age: 40
End: 2025-03-05
Payer: COMMERCIAL

## 2025-03-05 ENCOUNTER — HOSPITAL ENCOUNTER (OUTPATIENT)
Dept: RADIOLOGY | Facility: MEDICAL CENTER | Age: 40
End: 2025-03-05
Payer: COMMERCIAL

## 2025-03-05 VITALS
SYSTOLIC BLOOD PRESSURE: 100 MMHG | DIASTOLIC BLOOD PRESSURE: 60 MMHG | BODY MASS INDEX: 31.64 KG/M2 | WEIGHT: 213.6 LBS | HEIGHT: 69 IN | HEART RATE: 70 BPM | OXYGEN SATURATION: 98 % | TEMPERATURE: 97.3 F

## 2025-03-05 DIAGNOSIS — M25.552 PAIN OF LEFT HIP: ICD-10-CM

## 2025-03-05 DIAGNOSIS — E66.9 OBESITY (BMI 30-39.9): ICD-10-CM

## 2025-03-05 DIAGNOSIS — M25.531 WRIST PAIN, CHRONIC, RIGHT: ICD-10-CM

## 2025-03-05 DIAGNOSIS — Z00.00 HEALTHCARE MAINTENANCE: ICD-10-CM

## 2025-03-05 DIAGNOSIS — R63.5 EXCESSIVE WEIGHT GAIN: ICD-10-CM

## 2025-03-05 DIAGNOSIS — Z86.39 HISTORY OF HYPOTHYROIDISM: ICD-10-CM

## 2025-03-05 DIAGNOSIS — R63.5 WEIGHT GAIN: ICD-10-CM

## 2025-03-05 DIAGNOSIS — G43.011 INTRACTABLE MIGRAINE WITHOUT AURA AND WITH STATUS MIGRAINOSUS: ICD-10-CM

## 2025-03-05 DIAGNOSIS — E78.5 DYSLIPIDEMIA: ICD-10-CM

## 2025-03-05 DIAGNOSIS — G89.29 WRIST PAIN, CHRONIC, RIGHT: ICD-10-CM

## 2025-03-05 PROCEDURE — 36415 COLL VENOUS BLD VENIPUNCTURE: CPT

## 2025-03-05 PROCEDURE — 73110 X-RAY EXAM OF WRIST: CPT | Mod: RT

## 2025-03-05 PROCEDURE — 99214 OFFICE O/P EST MOD 30 MIN: CPT

## 2025-03-05 PROCEDURE — 84439 ASSAY OF FREE THYROXINE: CPT

## 2025-03-05 PROCEDURE — 86376 MICROSOMAL ANTIBODY EACH: CPT

## 2025-03-05 PROCEDURE — 73521 X-RAY EXAM HIPS BI 2 VIEWS: CPT

## 2025-03-05 PROCEDURE — 3078F DIAST BP <80 MM HG: CPT

## 2025-03-05 PROCEDURE — 84443 ASSAY THYROID STIM HORMONE: CPT

## 2025-03-05 PROCEDURE — 3074F SYST BP LT 130 MM HG: CPT

## 2025-03-05 RX ORDER — RIZATRIPTAN BENZOATE 10 MG/1
10 TABLET ORAL
Qty: 10 TABLET | Refills: 11 | Status: SHIPPED | OUTPATIENT
Start: 2025-03-05 | End: 2025-04-04

## 2025-03-05 ASSESSMENT — ENCOUNTER SYMPTOMS
VOMITING: 0
BLURRED VISION: 0
ABDOMINAL PAIN: 0
WEAKNESS: 0
COUGH: 0
FEVER: 0
DIZZINESS: 0
SHORTNESS OF BREATH: 0
MYALGIAS: 0
NAUSEA: 0
DIARRHEA: 0
WEIGHT LOSS: 0
HEADACHES: 0
CHILLS: 0
CONSTIPATION: 0

## 2025-03-05 ASSESSMENT — FIBROSIS 4 INDEX: FIB4 SCORE: 0.59

## 2025-03-05 ASSESSMENT — PATIENT HEALTH QUESTIONNAIRE - PHQ9: CLINICAL INTERPRETATION OF PHQ2 SCORE: 0
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
## 2025-03-06 ENCOUNTER — RESULTS FOLLOW-UP (OUTPATIENT)
Dept: MEDICAL GROUP | Facility: PHYSICIAN GROUP | Age: 40
End: 2025-03-06
Payer: COMMERCIAL

## 2025-03-06 LAB
T4 FREE SERPL-MCNC: 1.22 NG/DL (ref 0.93–1.7)
THYROPEROXIDASE AB SERPL-ACNC: 12.7 IU/ML (ref 0–9)
TSH SERPL-ACNC: 2.49 UIU/ML (ref 0.35–5.5)

## 2025-03-07 ENCOUNTER — PHYSICAL THERAPY (OUTPATIENT)
Dept: PHYSICAL THERAPY | Facility: REHABILITATION | Age: 40
End: 2025-03-07
Payer: COMMERCIAL

## 2025-03-07 DIAGNOSIS — M25.511 ACUTE PAIN OF RIGHT SHOULDER: ICD-10-CM

## 2025-03-07 PROCEDURE — 97140 MANUAL THERAPY 1/> REGIONS: CPT

## 2025-03-07 PROCEDURE — 97110 THERAPEUTIC EXERCISES: CPT

## 2025-03-07 PROCEDURE — 97014 ELECTRIC STIMULATION THERAPY: CPT

## 2025-03-07 NOTE — OP THERAPY DAILY TREATMENT
"  Outpatient Physical Therapy  DAILY TREATMENT     [facility] Physical [address]  [address]  [place] [address]  Phone:  [phone]  Fax:  [phone]    Date: 03/07/2025    Patient: Arely Matt  YOB: 1985  MRN: [id number]     Time Calculation    Start time: 0815  Stop time: 0930 Time Calculation (min): 75 minutes         Chief Complaint: Shoulder Problem    Visit #: 7    SUBJECTIVE:  Pt reports R shoulder is more achy than last week, but still better overall. Received injection on Monday that helped a little, but is now sore again.     OBJECTIVE:  Current objective measures:           Therapeutic Exercises (CPT 30016):     1. LEONCIO w/ PTB, x15 warm-up    2. W raises, to fatigue    3. SL ER punch 3#, to fatigue    4. Wall slide with lift off w/ OTB, reviewed    5. TRX rows, not today, cueing scap retraction    8. Body blade - yellow (SL), 2x20\", horizontal., vertical    9. Body blade PNF, 2x fatigue    10. Resisted diana w/ PTB, to fatigue      Therapeutic Exercise Summary: Access Code: [email or web address]  URL: [email or web address]  Date: 02/11/2025  Prepared by: Rolando Perdomo    Exercises  - Shoulder External Rotation and Scapular Retraction with Resistance  - 1 x daily - 7 x weekly - 3 sets - 10 reps  - Shoulder Internal Rotation with Resistance  - 1 x daily - 7 x weekly - 3 sets - 10 reps  - Shoulder extension with resistance - Neutral  - 1 x daily - 7 x weekly - 3 sets - 10 reps  - Standing Shoulder Row with Anchored Resistance  - 1 x daily - 7 x weekly - 3 sets - 10 reps  - Isometric Shoulder Abduction at Wall  - 5 x daily - 7 x weekly - 5-10 reps - 15 seconds hold  - Sidelying Full Range Shoulder Abduction with Resistance  - 1 x daily - 7 x weekly - reps to fatigue hold  - Shoulder PNF D2 with Resistance  - 1 x daily - 7 x weekly - reps to fatigue hold    Therapeutic Treatments and Modalities:     1. Manual Therapy (CPT 00671), see summary    2. E Stim Unattended " (CPT 26675), [facility] w/ MHP to R shoulder, x15'    Therapeutic Treatment and Modalities Summary: Manual:  STM/ART R infraspinatus;    DN: Patient signed informed written release and verbally agreed with informed consent to procedure of dry needling   skin prep with isopropyl alcohol prep  -R infraspinatus, x3  -Estim w/ FDN, pw 80, 4 Hz - x 10'  -Was able to reproduce lateral shoulder pain  -Demo initial irregular muscle contracted, but normalized by end of session  -No adverse reactions observed post treatment     Time-based treatments/modalities:    Physical Therapy Timed Treatment Charges  Manual therapy minutes (CPT 82382): 15 minutes  Therapeutic exercise minutes (CPT 06106): 27 minutes      ASSESSMENT:   Response to treatment: Pt demo improved tolerance to abduction/lateral raises. Was able to increase resistance and load. Pt is progressing at this time and is appropriate to cont PT.    PLAN/RECOMMENDATIONS:   Plan for treatment: therapy treatment to continue next visit.  Planned interventions for next visit: continue with current treatment.

## 2025-03-10 DIAGNOSIS — E06.3 HYPOTHYROIDISM DUE TO HASHIMOTO'S THYROIDITIS: ICD-10-CM

## 2025-03-10 RX ORDER — LEVOTHYROXINE SODIUM 25 UG/1
25 TABLET ORAL
Qty: 90 TABLET | Refills: 3 | Status: SHIPPED | OUTPATIENT
Start: 2025-03-10

## 2025-03-10 NOTE — Clinical Note
REFERRAL APPROVAL NOTICE         Sent on March 10, 2025                   Arely Matt  [address] [facility] [address]                   Dear Ms. Matt,    After a careful review of the medical information and benefit coverage, [facility] has processed your referral. See below for additional details.    If applicable, you must be actively enrolled with your insurance for coverage of the authorized service. If you have any questions regarding your coverage, please contact your insurance directly.    REFERRAL INFORMATION   Referral #:  [id number]  Referred-To Department    Referred-By Provider:  Orthopedics    Ender Luevano D.N.P.   [facility]      [address]  [place] [address] [phone] [address] [place] [address] [place] [address]  [phone]    Referral Start Date:  03/05/2025  Referral End Date:   03/05/2026             SCHEDULING  If you do not already have an appointment, please call [phone] to make an appointment.     MORE INFORMATION  If you do not already have a [employer] account, sign up at: [email or web address]  You can access your medical information, make appointments, see lab results, billing information, and more.  If you have questions regarding this referral, please contact  the [facility] Referrals department at:             [phone]. Monday - Friday 8:00AM - 5:00PM.     Sincerely,    [facility]

## 2025-03-11 ENCOUNTER — PHYSICAL THERAPY (OUTPATIENT)
Dept: PHYSICAL THERAPY | Facility: REHABILITATION | Age: 40
End: 2025-03-11
Payer: COMMERCIAL

## 2025-03-11 DIAGNOSIS — M25.511 ACUTE PAIN OF RIGHT SHOULDER: ICD-10-CM

## 2025-03-11 PROCEDURE — 97110 THERAPEUTIC EXERCISES: CPT

## 2025-03-11 PROCEDURE — 97014 ELECTRIC STIMULATION THERAPY: CPT

## 2025-03-11 NOTE — OP THERAPY DAILY TREATMENT
"  Outpatient Physical Therapy  DAILY TREATMENT     [facility] Physical [address].  [address]  [place] [address]  Phone:  [phone]  Fax:  [phone]    Date: 03/11/2025    Patient: Arely Matt  YOB: 1985  MRN: [id number]     Time Calculation    Start time: 1632  Stop time: 1735 Time Calculation (min): 63 minutes         Chief Complaint: Shoulder Problem    Visit #: 8    SUBJECTIVE:  Pt reports reaching OH is getting easier and less painful.    OBJECTIVE:  Current objective measures:           Therapeutic Exercises (CPT 77276):     1. LEONCIO w/ PTB, x15 warm-up    2. W raises, to fatigue    3. SL ER punch 3#, 2x fatigue    4. Wall slide with lift off w/ OTB, reviewed    5. TRX rows, not today, cueing scap retraction    6. PNF w/ PTB->GTB, 2x fatigue    8. Body blade - yellow (SL), 2x20\", horizontal., vertical    9. Body blade PNF yellow-> black, 2x fatigue    10. Resisted diana w/ PTB, to fatigue    11. Ts, Ys w/ OTB, to fatigue    12. T/s ext w/ FR, x2'    13. Foam roll mobility series, x5', pec stretch, Ts, alligator, diana      Therapeutic Exercise Summary: Access Code: [email or web address]  URL: [email or web address]  Date: 02/11/2025  Prepared by: Rolando Perdomo    Exercises  - Shoulder External Rotation and Scapular Retraction with Resistance  - 1 x daily - 7 x weekly - 3 sets - 10 reps  - Shoulder Internal Rotation with Resistance  - 1 x daily - 7 x weekly - 3 sets - 10 reps  - Shoulder extension with resistance - Neutral  - 1 x daily - 7 x weekly - 3 sets - 10 reps  - Standing Shoulder Row with Anchored Resistance  - 1 x daily - 7 x weekly - 3 sets - 10 reps  - Isometric Shoulder Abduction at Wall  - 5 x daily - 7 x weekly - 5-10 reps - 15 seconds hold  - Sidelying Full Range Shoulder Abduction with Resistance  - 1 x daily - 7 x weekly - reps to fatigue hold  - Shoulder PNF D2 with Resistance  - 1 x daily - 7 x weekly - reps to fatigue hold    Therapeutic Treatments " and Modalities:     1. Manual Therapy (CPT 36294), not today    2. E Stim Unattended (CPT 63377), [facility] w/ MHP to R shoulder, x15'    Therapeutic Treatment and Modalities Summary: Manual: not today  STM/ART R infraspinatus;    DN: Patient signed informed written release and verbally agreed with informed consent to procedure of dry needling   skin prep with isopropyl alcohol prep  -R infraspinatus, x3  -Estim w/ FDN, pw 80, 4 Hz - x 10'  -Was able to reproduce lateral shoulder pain  -Demo initial irregular muscle contracted, but normalized by end of session  -No adverse reactions observed post treatment     Time-based treatments/modalities:    Physical Therapy Timed Treatment Charges  Therapeutic exercise minutes (CPT 30537): 40 minutes        ASSESSMENT:   Response to treatment: Progressed resistance and difficulty of RTC and parascapular strengthening exercises. Pt demo good tolerance and appropriate eccentric control. Pt is progressing at this time and is appropriate to cont PT.      PLAN/RECOMMENDATIONS:   Plan for treatment: therapy treatment to continue next visit.  Planned interventions for next visit: continue with current treatment.

## 2025-04-02 ENCOUNTER — PHYSICAL THERAPY (OUTPATIENT)
Dept: PHYSICAL THERAPY | Facility: REHABILITATION | Age: 40
End: 2025-04-02
Payer: COMMERCIAL

## 2025-04-02 DIAGNOSIS — M25.511 ACUTE PAIN OF RIGHT SHOULDER: ICD-10-CM

## 2025-04-02 PROCEDURE — 97110 THERAPEUTIC EXERCISES: CPT

## 2025-04-02 NOTE — OP THERAPY DAILY TREATMENT
Outpatient Physical Therapy  DAILY TREATMENT     [facility] Physical [address].  [address]  [place] [address]  Phone:  [phone]  Fax:  [phone]    Date: 04/02/2025    Patient: Arely Matt  YOB: 1985  MRN: [id number]     Time Calculation    Start time: 1631  Stop time: 1702 Time Calculation (min): 31 minutes         Chief Complaint: Shoulder Problem    Visit #: 9    SUBJECTIVE:  Pt reports shoulder is 100% PLOF. Is ready for DC.    1. Pt will be compliant with HEP 3-5x/ week for increased ROM, strength, reduction in pain symptoms - met  2. Pt will demo to push up from chair with R shoulder pain 2/10 or better for improved functional mobility - met  Short term goal time span:  2-4 weeks      Long Term Goals:    1. Pt will demo global R shoulder strength 5/5 in order to start home renovations - met  2. Pt will report ability to dress self without shoulder pain - met  3. Pt will report ability to complete 10 minutes vacuuming without R shoulder pain - met  Long term goal time span:  4-6 weeks    OBJECTIVE:  Current objective measures:     Shoulder MMT:  Flexion: L 5, R 5  Extension: L 5, R 5  Abduction: L 5, R 5  ER at 0°: L 5, R 5  ER at 90°: L 5, R 5  Quickdash General Total Score: 0       Therapeutic Exercises (CPT 10064):     1. LEONCIO w/ PTB, x15 warm-up    2. W raises, to fatigue    3. HADDER box w/ PTB, to fatigue    4. SL ER punch 3#, to fatigue    5. Resisted diana w/ PTB, to fatigue    6. PNF w/ PTB, to fatigue    12. T/s ext w/ FR, x2'    13. Foam roll mobility series, x5', pec stretch, Ts, alligator, diana      Therapeutic Exercise Summary: Access Code: [email or web address]  URL: [email or web address]  Date: 04/02/2025  Prepared by: Rolando Perdomo    Exercises  - Shoulder External Rotation and Scapular Retraction with Resistance  - 1 x daily - 7 x weekly - 3 sets - 10 reps  - Shoulder extension with resistance - Neutral  - 1 x daily - 7 x weekly - 3 sets - 10  reps  - Standing Shoulder Row with Anchored Resistance  - 1 x daily - 7 x weekly - 3 sets - 10 reps  - Sidelying Full Range Shoulder Abduction with Resistance  - 1 x daily - 7 x weekly - reps to fatigue hold  - Isometric Shoulder Abduction at Wall  - 5 x daily - 7 x weekly - 5-10 reps - 15 seconds hold  - Shoulder PNF D2 with Resistance  - 1 x daily - 7 x weekly - reps to fatigue hold  - Shoulder Flexion Serratus Activation with Resistance  - 1 x daily - 7 x weekly - reps to fatigue hold  - Wall [place]  - 1 x daily - 7 x weekly - reps to fatigue hold      Time-based treatments/modalities:    Physical Therapy Timed Treatment Charges  Therapeutic exercise minutes (CPT 97962): 31 minutes      ASSESSMENT:   Response to treatment: See DC note    PLAN/RECOMMENDATIONS:   Plan for treatment: discharge patient due to accomplished goals.

## 2025-04-02 NOTE — OP THERAPY DISCHARGE SUMMARY
Outpatient Physical Therapy  DISCHARGE SUMMARY NOTE      [facility] [address].  [address]  [place] [address]  Phone:  [phone]  Fax:  [phone]    Date of Visit: 04/02/2025    Patient: Arely Matt  YOB: 1985  MRN: [id number]     Referring Provider: Ender Luevano D.N.P.  [address]  [facility] [address]  [place] [address]   Referring Diagnosis No admission diagnoses are documented for this encounter.         Functional Assessment Used        Your patient is being discharged from Physical Therapy with the following comments:   Goals met    Comments:  Pt has attended 9 visits from 1/21/25 to 4/2/25. Pt has met all functional goals and is appropriate for DC. Reviewed HEP. Pt demo full independence and proper mechanics of all exercises.        Recommendations:  Continue HEP    Rolando Perdomo, PT, DPT    Date: 4/2/2025

## 2025-04-03 ENCOUNTER — OFFICE VISIT (OUTPATIENT)
Dept: SPORTS MEDICINE | Facility: OTHER | Age: 40
End: 2025-04-03
Payer: COMMERCIAL

## 2025-04-03 VITALS
HEART RATE: 82 BPM | HEIGHT: 69 IN | DIASTOLIC BLOOD PRESSURE: 74 MMHG | BODY MASS INDEX: 31.64 KG/M2 | SYSTOLIC BLOOD PRESSURE: 118 MMHG | OXYGEN SATURATION: 97 % | RESPIRATION RATE: 16 BRPM | WEIGHT: 213.6 LBS | TEMPERATURE: 98.1 F

## 2025-04-03 DIAGNOSIS — M75.81 ROTATOR CUFF TENDINITIS, RIGHT: ICD-10-CM

## 2025-04-03 DIAGNOSIS — M75.41 SHOULDER IMPINGEMENT SYNDROME, RIGHT: ICD-10-CM

## 2025-04-03 PROCEDURE — 3074F SYST BP LT 130 MM HG: CPT | Performed by: FAMILY MEDICINE

## 2025-04-03 PROCEDURE — 3078F DIAST BP <80 MM HG: CPT | Performed by: FAMILY MEDICINE

## 2025-04-03 PROCEDURE — 99213 OFFICE O/P EST LOW 20 MIN: CPT | Performed by: FAMILY MEDICINE

## 2025-04-03 ASSESSMENT — FIBROSIS 4 INDEX: FIB4 SCORE: 0.59

## 2025-04-03 NOTE — PROGRESS NOTES
"Chief Complaint   Patient presents with    Shoulder Pain     R shoulder pain      CHIEF COMPLAINT:  Arely Matt female presenting at the request of Ender Luevano D.N.P.  for evaluation of Shoulder pain.     Arely Matt is complaining of right shoulder pain (right-handed dominant)  present for 8 months (roughly March 2023)  No specific injury, but around the time her pain started she was doing a renovation at home which involves overhead activity including removing popcorn and doing drywall work and painting  Pain is at the deltoid region in a rotator cuff distribution  Quality is sharp  Pain is Non-radiating  Aggravated by movement, overhead activity, putting on a shirt, reaching for her seatbelt as well as pushing and pulling  No alleviating factors  Pain is 4 out of 10  No numbness, tingling or radicular symptoms  She denies any cervical spine pain  Improved with  rest   no prior problems with this shoulder in the past  Prior Treatments: Seen by PCP in October 2024  Had x-rays, was referred to PT which she has scheduled to start in the end of January and was referred for further evaluation management  Prior studies: X-Ray   Medications tried for pain include: ibuprofen (OTC), icing with limited improvement/minimal  Mechanical Symptom history: No Locking and Popping which is not necessarily painful    UPDATE:  She is  nearly 100% improved, noticed improvement after about 1 week postinjection  She has been continuing her shoulder exercises    IT/technology support/desk work     PHYSICAL EXAM:  /74 (BP Location: Left arm, Patient Position: Sitting, BP Cuff Size: Adult)   Pulse 82   Temp 36.7 °C (98.1 °F) (Temporal)   Resp 16   Ht 1.753 m (5' 9\")   Wt 96.9 kg (213 lb 9.6 oz)   SpO2 97%   BMI 31.54 kg/m²     well-developed, well-nourished in no apparent distress, alert and oriented x 3.    Shoulder Exam:    RIGHT Shoulder:  No visible swelling   Range of motion INTACT  Tenderness: " Non-tender  Empty Can Testing 5/5 WITHOUT pain  Internal Rotation 5/5  External Rotation 5/5 WITHOUT pain  Lift Off Testing 5/5  Impingement testing [place] NEGATIVE Neer's testing NEGATIVE    LEFT Shoulder:  No visible swelling   Range of motion INTACT  Tenderness: Non-tender  Empty Can Testing 5/5  Internal Rotation 5/5  External Rotation 5/5  Lift Off Testing 5/5  Impingement testing [place]  NEGATIVE  Neer's testing NEGATIVE    Additional Findings: None    1. Rotator cuff tendinitis, right        2. Shoulder impingement syndrome, right          present since roughly March 2023  No specific injury, but around the time her pain started she was doing a renovation at home which involves overhead activity including removing popcorn and doing drywall work and painting    Continue physical therapy     RIGHT subacromial corticosteroid injection performed (March 3, 2025) HELPED    Since she is doing so well we will have her follow-up on an as-needed basis  IF symptoms recur we may consider MRI study of the RIGHT shoulder           1/3/2025 9:44 AM     HISTORY/REASON FOR EXAM:  Atraumatic Pain/Swelling/Deformity        TECHNIQUE/EXAM DESCRIPTION AND NUMBER OF VIEWS:  3 views of the RIGHT shoulder.     COMPARISON: None     FINDINGS:     No acute fracture or dislocation.  No joint osteoarthritis.        IMPRESSION:        1. No acute osseous abnormality.        Exam Ended: 01/03/25  9:44 AM Last Resulted: 01/03/25  8:10 PM       1/3/2025 9:30 AM     HISTORY/REASON FOR EXAM:  mass on right arm x4 months        TECHNIQUE/EXAM DESCRIPTION AND NUMBER OF VIEWS:  Limited ultrasound of the right upper extremity     COMPARISON: None     FINDINGS:  Focus ultrasound at the right upper arm demonstrates a 0.8 x 0.4 cm echogenic mass in the subcutaneous tissue.     IMPRESSION:     Likely a subcutaneous lipoma at the area of concern. The differential includes fat necrosis.        Exam Ended: 01/03/25  9:58 AM Last Resulted: 01/03/25   8:19 PM     Thank you Ender Luevano D.N.P. for allowing me to participate in caring for your patient.

## 2025-04-07 PROBLEM — S66.911A: Status: ACTIVE | Noted: 2025-04-07

## 2025-04-09 ENCOUNTER — APPOINTMENT (OUTPATIENT)
Dept: PHYSICAL THERAPY | Facility: REHABILITATION | Age: 40
End: 2025-04-09
Payer: COMMERCIAL

## 2025-05-07 ENCOUNTER — APPOINTMENT (OUTPATIENT)
Dept: ADMISSIONS | Facility: MEDICAL CENTER | Age: 40
End: 2025-05-07
Attending: STUDENT IN AN ORGANIZED HEALTH CARE EDUCATION/TRAINING PROGRAM
Payer: COMMERCIAL

## 2025-05-16 ENCOUNTER — PRE-ADMISSION TESTING (OUTPATIENT)
Dept: ADMISSIONS | Facility: MEDICAL CENTER | Age: 40
End: 2025-05-16
Attending: STUDENT IN AN ORGANIZED HEALTH CARE EDUCATION/TRAINING PROGRAM
Payer: COMMERCIAL

## 2025-05-16 NOTE — PREADMIT AVS NOTE
Current Medications   Medication Instructions    levothyroxine (SYNTHROID) 25 MCG Tab Continue taking medication as prescribed, including morning of procedure     rizatriptan (MAXALT) 10 MG tablet Hold medication day of procedure

## 2025-05-22 ENCOUNTER — ANESTHESIA EVENT (OUTPATIENT)
Dept: SURGERY | Facility: MEDICAL CENTER | Age: 40
End: 2025-05-22
Payer: COMMERCIAL

## 2025-05-22 ENCOUNTER — PRE-ADMISSION TESTING (OUTPATIENT)
Dept: ADMISSIONS | Facility: MEDICAL CENTER | Age: 40
End: 2025-05-22
Attending: STUDENT IN AN ORGANIZED HEALTH CARE EDUCATION/TRAINING PROGRAM
Payer: COMMERCIAL

## 2025-05-22 DIAGNOSIS — Z01.812 PRE-OPERATIVE LABORATORY EXAMINATION: Primary | ICD-10-CM

## 2025-05-22 LAB
BASOPHILS # BLD AUTO: 0.2 % (ref 0–1.8)
BASOPHILS # BLD: 0.02 K/UL (ref 0–0.12)
EOSINOPHIL # BLD AUTO: 0.14 K/UL (ref 0–0.51)
EOSINOPHIL NFR BLD: 1.4 % (ref 0–6.9)
ERYTHROCYTE [DISTWIDTH] IN BLOOD BY AUTOMATED COUNT: 44.2 FL (ref 35.9–50)
HCG SERPL QL: NEGATIVE
HCT VFR BLD AUTO: 41.4 % (ref 37–47)
HGB BLD-MCNC: 13.9 G/DL (ref 12–16)
IMM GRANULOCYTES # BLD AUTO: 0.05 K/UL (ref 0–0.11)
IMM GRANULOCYTES NFR BLD AUTO: 0.5 % (ref 0–0.9)
LYMPHOCYTES # BLD AUTO: 2.96 K/UL (ref 1–4.8)
LYMPHOCYTES NFR BLD: 28.5 % (ref 22–41)
MCH RBC QN AUTO: 30.1 PG (ref 27–33)
MCHC RBC AUTO-ENTMCNC: 33.6 G/DL (ref 32.2–35.5)
MCV RBC AUTO: 89.6 FL (ref 81.4–97.8)
MONOCYTES # BLD AUTO: 0.86 K/UL (ref 0–0.85)
MONOCYTES NFR BLD AUTO: 8.3 % (ref 0–13.4)
NEUTROPHILS # BLD AUTO: 6.34 K/UL (ref 1.82–7.42)
NEUTROPHILS NFR BLD: 61.1 % (ref 44–72)
NRBC # BLD AUTO: 0 K/UL
NRBC BLD-RTO: 0 /100 WBC (ref 0–0.2)
PLATELET # BLD AUTO: 238 K/UL (ref 164–446)
PMV BLD AUTO: 10.5 FL (ref 9–12.9)
RBC # BLD AUTO: 4.62 M/UL (ref 4.2–5.4)
WBC # BLD AUTO: 10.4 K/UL (ref 4.8–10.8)

## 2025-05-22 PROCEDURE — 84703 CHORIONIC GONADOTROPIN ASSAY: CPT

## 2025-05-22 PROCEDURE — 85025 COMPLETE CBC W/AUTO DIFF WBC: CPT

## 2025-05-22 PROCEDURE — 36415 COLL VENOUS BLD VENIPUNCTURE: CPT

## 2025-05-23 ENCOUNTER — ANESTHESIA (OUTPATIENT)
Dept: SURGERY | Facility: MEDICAL CENTER | Age: 40
End: 2025-05-23
Payer: COMMERCIAL

## 2025-05-23 ENCOUNTER — HOSPITAL ENCOUNTER (OUTPATIENT)
Facility: MEDICAL CENTER | Age: 40
End: 2025-05-23
Attending: STUDENT IN AN ORGANIZED HEALTH CARE EDUCATION/TRAINING PROGRAM | Admitting: STUDENT IN AN ORGANIZED HEALTH CARE EDUCATION/TRAINING PROGRAM
Payer: COMMERCIAL

## 2025-05-23 ENCOUNTER — PHARMACY VISIT (OUTPATIENT)
Dept: PHARMACY | Facility: MEDICAL CENTER | Age: 40
End: 2025-05-23
Payer: COMMERCIAL

## 2025-05-23 VITALS
TEMPERATURE: 97.4 F | DIASTOLIC BLOOD PRESSURE: 83 MMHG | OXYGEN SATURATION: 96 % | RESPIRATION RATE: 20 BRPM | SYSTOLIC BLOOD PRESSURE: 127 MMHG | WEIGHT: 210.1 LBS | BODY MASS INDEX: 31.12 KG/M2 | HEIGHT: 69 IN | HEART RATE: 72 BPM

## 2025-05-23 DIAGNOSIS — G89.18 ACUTE POSTOPERATIVE PAIN: Primary | ICD-10-CM

## 2025-05-23 LAB — PATHOLOGY CONSULT NOTE: NORMAL

## 2025-05-23 PROCEDURE — 160028 HCHG SURGERY MINUTES - 1ST 30 MINS LEVEL 3: Performed by: STUDENT IN AN ORGANIZED HEALTH CARE EDUCATION/TRAINING PROGRAM

## 2025-05-23 PROCEDURE — 88302 TISSUE EXAM BY PATHOLOGIST: CPT | Mod: 26 | Performed by: PATHOLOGY

## 2025-05-23 PROCEDURE — 160009 HCHG ANES TIME/MIN: Performed by: STUDENT IN AN ORGANIZED HEALTH CARE EDUCATION/TRAINING PROGRAM

## 2025-05-23 PROCEDURE — RXMED WILLOW AMBULATORY MEDICATION CHARGE: Performed by: STUDENT IN AN ORGANIZED HEALTH CARE EDUCATION/TRAINING PROGRAM

## 2025-05-23 PROCEDURE — 160048 HCHG OR STATISTICAL LEVEL 1-5: Performed by: STUDENT IN AN ORGANIZED HEALTH CARE EDUCATION/TRAINING PROGRAM

## 2025-05-23 PROCEDURE — 160002 HCHG RECOVERY MINUTES (STAT): Performed by: STUDENT IN AN ORGANIZED HEALTH CARE EDUCATION/TRAINING PROGRAM

## 2025-05-23 PROCEDURE — 700111 HCHG RX REV CODE 636 W/ 250 OVERRIDE (IP): Performed by: STUDENT IN AN ORGANIZED HEALTH CARE EDUCATION/TRAINING PROGRAM

## 2025-05-23 PROCEDURE — 160046 HCHG PACU - 1ST 60 MINS PHASE II: Performed by: STUDENT IN AN ORGANIZED HEALTH CARE EDUCATION/TRAINING PROGRAM

## 2025-05-23 PROCEDURE — 700101 HCHG RX REV CODE 250: Performed by: STUDENT IN AN ORGANIZED HEALTH CARE EDUCATION/TRAINING PROGRAM

## 2025-05-23 PROCEDURE — 700111 HCHG RX REV CODE 636 W/ 250 OVERRIDE (IP): Mod: JZ | Performed by: STUDENT IN AN ORGANIZED HEALTH CARE EDUCATION/TRAINING PROGRAM

## 2025-05-23 PROCEDURE — 700105 HCHG RX REV CODE 258: Performed by: STUDENT IN AN ORGANIZED HEALTH CARE EDUCATION/TRAINING PROGRAM

## 2025-05-23 PROCEDURE — 58661 LAPAROSCOPY REMOVE ADNEXA: CPT | Performed by: STUDENT IN AN ORGANIZED HEALTH CARE EDUCATION/TRAINING PROGRAM

## 2025-05-23 PROCEDURE — 88302 TISSUE EXAM BY PATHOLOGIST: CPT | Performed by: PATHOLOGY

## 2025-05-23 PROCEDURE — 700102 HCHG RX REV CODE 250 W/ 637 OVERRIDE(OP): Performed by: STUDENT IN AN ORGANIZED HEALTH CARE EDUCATION/TRAINING PROGRAM

## 2025-05-23 PROCEDURE — 160025 RECOVERY II MINUTES (STATS): Performed by: STUDENT IN AN ORGANIZED HEALTH CARE EDUCATION/TRAINING PROGRAM

## 2025-05-23 PROCEDURE — 160015 HCHG STAT PREOP MINUTES: Performed by: STUDENT IN AN ORGANIZED HEALTH CARE EDUCATION/TRAINING PROGRAM

## 2025-05-23 PROCEDURE — 160039 HCHG SURGERY MINUTES - EA ADDL 1 MIN LEVEL 3: Performed by: STUDENT IN AN ORGANIZED HEALTH CARE EDUCATION/TRAINING PROGRAM

## 2025-05-23 PROCEDURE — A9270 NON-COVERED ITEM OR SERVICE: HCPCS | Performed by: STUDENT IN AN ORGANIZED HEALTH CARE EDUCATION/TRAINING PROGRAM

## 2025-05-23 PROCEDURE — 160035 HCHG PACU - 1ST 60 MINS PHASE I: Performed by: STUDENT IN AN ORGANIZED HEALTH CARE EDUCATION/TRAINING PROGRAM

## 2025-05-23 RX ORDER — DIPHENHYDRAMINE HYDROCHLORIDE 50 MG/ML
12.5 INJECTION, SOLUTION INTRAMUSCULAR; INTRAVENOUS
Status: DISCONTINUED | OUTPATIENT
Start: 2025-05-23 | End: 2025-05-23 | Stop reason: HOSPADM

## 2025-05-23 RX ORDER — IBUPROFEN 800 MG/1
800 TABLET, FILM COATED ORAL EVERY 8 HOURS
Qty: 30 TABLET | Refills: 0 | Status: SHIPPED | OUTPATIENT
Start: 2025-05-23

## 2025-05-23 RX ORDER — HYDROMORPHONE HYDROCHLORIDE 1 MG/ML
0.4 INJECTION, SOLUTION INTRAMUSCULAR; INTRAVENOUS; SUBCUTANEOUS
Status: DISCONTINUED | OUTPATIENT
Start: 2025-05-23 | End: 2025-05-23 | Stop reason: HOSPADM

## 2025-05-23 RX ORDER — ONDANSETRON 2 MG/ML
INJECTION INTRAMUSCULAR; INTRAVENOUS PRN
Status: DISCONTINUED | OUTPATIENT
Start: 2025-05-23 | End: 2025-05-23 | Stop reason: SURG

## 2025-05-23 RX ORDER — LIDOCAINE HYDROCHLORIDE 40 MG/ML
SOLUTION TOPICAL PRN
Status: DISCONTINUED | OUTPATIENT
Start: 2025-05-23 | End: 2025-05-23 | Stop reason: SURG

## 2025-05-23 RX ORDER — ACETAMINOPHEN 500 MG
1000 TABLET ORAL ONCE
Status: COMPLETED | OUTPATIENT
Start: 2025-05-23 | End: 2025-05-23

## 2025-05-23 RX ORDER — MEPERIDINE HYDROCHLORIDE 25 MG/ML
12.5 INJECTION INTRAMUSCULAR; INTRAVENOUS; SUBCUTANEOUS
Status: DISCONTINUED | OUTPATIENT
Start: 2025-05-23 | End: 2025-05-23 | Stop reason: HOSPADM

## 2025-05-23 RX ORDER — HYDROMORPHONE HYDROCHLORIDE 1 MG/ML
0.2 INJECTION, SOLUTION INTRAMUSCULAR; INTRAVENOUS; SUBCUTANEOUS
Status: DISCONTINUED | OUTPATIENT
Start: 2025-05-23 | End: 2025-05-23 | Stop reason: HOSPADM

## 2025-05-23 RX ORDER — CELECOXIB 200 MG/1
200 CAPSULE ORAL ONCE
Status: COMPLETED | OUTPATIENT
Start: 2025-05-23 | End: 2025-05-23

## 2025-05-23 RX ORDER — BUPIVACAINE HYDROCHLORIDE AND EPINEPHRINE 2.5; 5 MG/ML; UG/ML
INJECTION, SOLUTION EPIDURAL; INFILTRATION; INTRACAUDAL; PERINEURAL
Status: DISPENSED
Start: 2025-05-23 | End: 2025-05-24

## 2025-05-23 RX ORDER — HYDROMORPHONE HYDROCHLORIDE 1 MG/ML
0.1 INJECTION, SOLUTION INTRAMUSCULAR; INTRAVENOUS; SUBCUTANEOUS
Status: DISCONTINUED | OUTPATIENT
Start: 2025-05-23 | End: 2025-05-23 | Stop reason: HOSPADM

## 2025-05-23 RX ORDER — OXYCODONE HCL 5 MG/5 ML
5 SOLUTION, ORAL ORAL
Status: COMPLETED | OUTPATIENT
Start: 2025-05-23 | End: 2025-05-23

## 2025-05-23 RX ORDER — SCOPOLAMINE 1 MG/3D
1 PATCH, EXTENDED RELEASE TRANSDERMAL
Status: DISCONTINUED | OUTPATIENT
Start: 2025-05-23 | End: 2025-05-23 | Stop reason: HOSPADM

## 2025-05-23 RX ORDER — EPHEDRINE SULFATE 50 MG/ML
5 INJECTION, SOLUTION INTRAVENOUS
Status: DISCONTINUED | OUTPATIENT
Start: 2025-05-23 | End: 2025-05-23 | Stop reason: HOSPADM

## 2025-05-23 RX ORDER — MAGNESIUM SULFATE HEPTAHYDRATE 40 MG/ML
INJECTION, SOLUTION INTRAVENOUS PRN
Status: DISCONTINUED | OUTPATIENT
Start: 2025-05-23 | End: 2025-05-23 | Stop reason: SURG

## 2025-05-23 RX ORDER — SODIUM CHLORIDE, SODIUM LACTATE, POTASSIUM CHLORIDE, CALCIUM CHLORIDE 600; 310; 30; 20 MG/100ML; MG/100ML; MG/100ML; MG/100ML
INJECTION, SOLUTION INTRAVENOUS
Status: DISCONTINUED | OUTPATIENT
Start: 2025-05-23 | End: 2025-05-23 | Stop reason: SURG

## 2025-05-23 RX ORDER — HYDRALAZINE HYDROCHLORIDE 20 MG/ML
5 INJECTION INTRAMUSCULAR; INTRAVENOUS
Status: DISCONTINUED | OUTPATIENT
Start: 2025-05-23 | End: 2025-05-23 | Stop reason: HOSPADM

## 2025-05-23 RX ORDER — OXYCODONE HYDROCHLORIDE 5 MG/1
5 TABLET ORAL EVERY 4 HOURS PRN
Qty: 10 TABLET | Refills: 0 | Status: SHIPPED | OUTPATIENT
Start: 2025-05-23 | End: 2025-05-26

## 2025-05-23 RX ORDER — ONDANSETRON 2 MG/ML
4 INJECTION INTRAMUSCULAR; INTRAVENOUS
Status: DISCONTINUED | OUTPATIENT
Start: 2025-05-23 | End: 2025-05-23 | Stop reason: HOSPADM

## 2025-05-23 RX ORDER — OXYCODONE HCL 5 MG/5 ML
10 SOLUTION, ORAL ORAL
Status: COMPLETED | OUTPATIENT
Start: 2025-05-23 | End: 2025-05-23

## 2025-05-23 RX ORDER — ALBUTEROL SULFATE 5 MG/ML
2.5 SOLUTION RESPIRATORY (INHALATION)
Status: DISCONTINUED | OUTPATIENT
Start: 2025-05-23 | End: 2025-05-23 | Stop reason: HOSPADM

## 2025-05-23 RX ORDER — ACETAMINOPHEN 500 MG
1000 TABLET ORAL EVERY 8 HOURS
Qty: 30 TABLET | Refills: 0 | Status: SHIPPED | OUTPATIENT
Start: 2025-05-23

## 2025-05-23 RX ORDER — DEXAMETHASONE SODIUM PHOSPHATE 4 MG/ML
INJECTION, SOLUTION INTRA-ARTICULAR; INTRALESIONAL; INTRAMUSCULAR; INTRAVENOUS; SOFT TISSUE PRN
Status: DISCONTINUED | OUTPATIENT
Start: 2025-05-23 | End: 2025-05-23 | Stop reason: SURG

## 2025-05-23 RX ORDER — SODIUM CHLORIDE, SODIUM LACTATE, POTASSIUM CHLORIDE, CALCIUM CHLORIDE 600; 310; 30; 20 MG/100ML; MG/100ML; MG/100ML; MG/100ML
INJECTION, SOLUTION INTRAVENOUS CONTINUOUS
Status: DISCONTINUED | OUTPATIENT
Start: 2025-05-23 | End: 2025-05-23 | Stop reason: HOSPADM

## 2025-05-23 RX ORDER — HALOPERIDOL 5 MG/ML
1 INJECTION INTRAMUSCULAR
Status: DISCONTINUED | OUTPATIENT
Start: 2025-05-23 | End: 2025-05-23 | Stop reason: HOSPADM

## 2025-05-23 RX ORDER — KETOROLAC TROMETHAMINE 15 MG/ML
INJECTION, SOLUTION INTRAMUSCULAR; INTRAVENOUS PRN
Status: DISCONTINUED | OUTPATIENT
Start: 2025-05-23 | End: 2025-05-23 | Stop reason: SURG

## 2025-05-23 RX ORDER — BUPIVACAINE HYDROCHLORIDE AND EPINEPHRINE 2.5; 5 MG/ML; UG/ML
INJECTION, SOLUTION EPIDURAL; INFILTRATION; INTRACAUDAL; PERINEURAL
Status: DISCONTINUED | OUTPATIENT
Start: 2025-05-23 | End: 2025-05-23 | Stop reason: HOSPADM

## 2025-05-23 RX ADMIN — KETOROLAC TROMETHAMINE 15 MG: 15 INJECTION, SOLUTION INTRAMUSCULAR; INTRAVENOUS at 14:20

## 2025-05-23 RX ADMIN — FENTANYL CITRATE 50 MCG: 50 INJECTION, SOLUTION INTRAMUSCULAR; INTRAVENOUS at 13:30

## 2025-05-23 RX ADMIN — ACETAMINOPHEN 1000 MG: 500 TABLET ORAL at 12:44

## 2025-05-23 RX ADMIN — SUGAMMADEX 200 MG: 100 INJECTION, SOLUTION INTRAVENOUS at 14:20

## 2025-05-23 RX ADMIN — SCOPOLAMINE 1 PATCH: 1.5 PATCH, EXTENDED RELEASE TRANSDERMAL at 12:44

## 2025-05-23 RX ADMIN — DEXAMETHASONE SODIUM PHOSPHATE 8 MG: 4 INJECTION INTRA-ARTICULAR; INTRALESIONAL; INTRAMUSCULAR; INTRAVENOUS; SOFT TISSUE at 13:37

## 2025-05-23 RX ADMIN — ROCURONIUM BROMIDE 50 MG: 10 INJECTION INTRAVENOUS at 13:34

## 2025-05-23 RX ADMIN — SODIUM CHLORIDE, POTASSIUM CHLORIDE, SODIUM LACTATE AND CALCIUM CHLORIDE: 600; 310; 30; 20 INJECTION, SOLUTION INTRAVENOUS at 13:27

## 2025-05-23 RX ADMIN — FENTANYL CITRATE 50 MCG: 50 INJECTION, SOLUTION INTRAMUSCULAR; INTRAVENOUS at 14:53

## 2025-05-23 RX ADMIN — ONDANSETRON 4 MG: 2 INJECTION INTRAMUSCULAR; INTRAVENOUS at 14:00

## 2025-05-23 RX ADMIN — OXYCODONE HYDROCHLORIDE 5 MG: 5 SOLUTION ORAL at 14:39

## 2025-05-23 RX ADMIN — CELECOXIB 200 MG: 200 CAPSULE ORAL at 12:44

## 2025-05-23 RX ADMIN — LIDOCAINE HYDROCHLORIDE 4 ML: 40 SOLUTION TOPICAL at 13:34

## 2025-05-23 RX ADMIN — FENTANYL CITRATE 25 MCG: 50 INJECTION, SOLUTION INTRAMUSCULAR; INTRAVENOUS at 14:39

## 2025-05-23 RX ADMIN — FENTANYL CITRATE 50 MCG: 50 INJECTION, SOLUTION INTRAMUSCULAR; INTRAVENOUS at 13:57

## 2025-05-23 RX ADMIN — MAGNESIUM SULFATE HEPTAHYDRATE 2 G: 4 INJECTION, SOLUTION INTRAVENOUS at 13:45

## 2025-05-23 RX ADMIN — PROPOFOL 200 MG: 10 INJECTION, EMULSION INTRAVENOUS at 13:33

## 2025-05-23 ASSESSMENT — PAIN DESCRIPTION - PAIN TYPE: TYPE: ACUTE PAIN

## 2025-05-23 ASSESSMENT — FIBROSIS 4 INDEX: FIB4 SCORE: 0.63

## 2025-05-23 NOTE — DISCHARGE INSTRUCTIONS
What to Expect Post Anesthesia    Rest and take it easy for the first 24 hours.  A responsible adult is recommended to remain with you during that time.  It is normal to feel sleepy.  We encourage you to not do anything that requires balance, judgment or coordination.    FOR 24 HOURS DO NOT:  Drive, operate machinery or run household appliances.  Drink beer or alcoholic beverages.  Make important decisions or sign legal documents.    To avoid nausea, slowly advance diet as tolerated, avoiding spicy or greasy foods for the first day.  Add more substantial food to your diet according to your provider's instructions.  Babies can be fed formula or breast milk as soon as they are hungry.  INCREASE FLUIDS AND FIBER TO AVOID CONSTIPATION.    MILD FLU-LIKE SYMPTOMS ARE NORMAL.  YOU MAY EXPERIENCE GENERALIZED MUSCLE ACHES, THROAT IRRITATION, HEADACHE AND/OR SOME NAUSEA.    If any questions arise, call your provider.  If your provider is not available, please feel free to call the Surgical Center at [phone].    MEDICATIONS: Resume taking daily medication.  Take prescribed pain medication with food.  If no medication is prescribed, you may take non-aspirin pain medication if needed.  PAIN MEDICATION CAN BE VERY CONSTIPATING.  Take a stool softener or laxative such as senokot, pericolace, or milk of magnesia if needed.    Last pain medication given 100mg Tylenol at 12:45pm, may take additional tylenol at 8:45pm.  Toradol (NSAID, similar to ibuprofen) at 2:20pm, May take additional dose of ibuprofen at 10:20pm.  5mg Oxycodone at 2:40pm, may take next dose after 6:40pm if needed.     Please remove scopolamine patch behind your ear within 72 hours or sooner.  Wash hands and behind ear after removing.

## 2025-05-23 NOTE — OR NURSING
1430- pt arrives from OR to PACU 6, report received from RN and anesthesia. Pt place on monitor. VSS,  NAD noted. Oral airway in place and d/c'd on arrival.   O2 4L via mask.    Abd lap sites x 3- open to air, dermabond noted.   Julisa-pad in place- CDI    1440-medicated per MAR for 5/10 pain, tolerating clears    1455- brought to bedside    1530-pt assisted to BR, voids with no difficulty.  Dresses self independently.      1540-discharge instructions given to pt and - verbalizes understanding.  to pharmacy to  rx    1558-PIV d/c'd  And pt escorted out in w/c by RAISA Wright  All belongings accounted for

## 2025-05-23 NOTE — ANESTHESIA PREPROCEDURE EVALUATION
Case: [id number] Date/Time: 05/23/25 1415    Procedure: LAPAROSCOPIC BILATERAL SALPINGECTOMY, AND ANY OTHER INDICATED PROCEDURE    Pre-op diagnosis: STERILIZATION    Location: [facility] ROOM 21 / SURGERY SAME DAY [facility]    Surgeons: Margo Benjamin M.D.            Relevant Problems   NEURO   (positive) Intractable migraine without aura and with status migrainosus   (positive) Migraine without aura and without status migrainosus, not intractable      CARDIAC   (positive) Intractable migraine without aura and with status migrainosus   (positive) Migraine without aura and without status migrainosus, not intractable      GI   (positive) Gastroesophageal reflux disease       Physical Exam    Airway   Mallampati: II  TM distance: >3 FB  Neck ROM: full       Cardiovascular - normal exam  Rhythm: regular  Rate: normal    (-) murmur     Dental - normal exam           Pulmonary - normal examBreath sounds clear to auscultation     Abdominal    Neurological - normal exam                   Anesthesia Plan    ASA 2       Plan - general       Airway plan will be ETT          Induction: intravenous    Postoperative Plan: Postoperative administration of opioids is intended.    Pertinent diagnostic labs and testing reviewed    Informed Consent:    Anesthetic plan and risks discussed with patient.    Use of blood products discussed with: patient whom consented to blood products.

## 2025-05-23 NOTE — ANESTHESIA TIME REPORT
Anesthesia Start and Stop Event Times       Date Time Event    5/23/2025 1307 Ready for Procedure     1327 Anesthesia Start     1430 Anesthesia Stop          Responsible Staff  05/23/25      Name Role Begin End    Susie Spears M.D. Anesth 1327 1430          Overtime Reason:  no overtime (within assigned shift)    Comments:

## 2025-05-23 NOTE — OP REPORT
OPERATIVE REPORT    Patient: Arely Matt  MRN: [id number]   YOB: 1985   Age: 39 y.o.   Sex: female    Date of Procedure: 5/23/2025     Preoperative Diagnosis: STERILIZATION  Postoperative Diagnosis: STERILIZATION   Procedures: laparoscopic bilateral salpingectomy  Surgeons and Role:     * Margo Benjamin M.D. - Primary     * Aarti Haddad M.D. - Assisting  Anesthesia: General   Antibiotics: none  Estimated Blood Loss: Minimal  Drains: armenta removed at end of case    Specimens: bilateral fallopian tubes    Findings: Normal appearing external genitalia. Uterus with approx 2cm anterior left subserosal fibroid. Normal appearing ovaries and fallopian tubes.     Complications: none    INDICATIONS:   39 y.o. presents for operative laparoscopy for permanent sterilization. The risks, benefits and alternatives of laparoscopy were discussed with the patient, including but not limited to infection, disfiguring scar, severe loss of blood, venous thrombosis, injury to bowel, bladder or ureter, injury to blood vessels, and rare chance of needing to convert to laparotomy to complete the surgery or any repair.  She was counseled on the permanent/irreversible nature of removal of fallopian tubes for sterilization. Her questions were answered in regards to the procedural risks and she elected to proceed.    PROCEDURE:   The patient was taken to operating room and placed in the supine position. SCDs were placed on the patient's lower extremities and were functioning. Following the induction of general anesthesia and intubation, the legs were raised and placed in lithotomy position. The abdomen, vagina, and perineum were prepped and sterilely draped in the usual fashion. A armenta catheter was placed to empty the bladder. A sponge stick was placed in the vagina for manipulation.     Attention was then turned to the abdomen. The umbilicus was infiltrated with marcaine 0.25% with epinephrine. A 5mm skin incision was  then made in the umbilicus. The Veress needle was introduced and intraperitoneal placement was confirmed using hanging drop of saline technique. Opening pressure was 4mmHg.  Pneumoperitoneum was created with CO2 gas. A 5mm port was placed through the incision on the first attempt without difficulty. Under direct visualization, two 5mm ports were placed in the bilateral lower quadrants under direct visualization. All trocar sites were injected with 0.25% marcaine with epinephrine for analgesia. At this time an upper abdominal and pelvic survey were completed. The investigation revealed the findings listed above. Areas of the small bowel were noted to be translucent and blue colored. Requested General Surgery to evaluate. While waiting for general surgery, the left tube was grasped with a blunt grasper and elevated and then the Ligasure was used to ligate the tubo-ovarian ligament. Dissection was carried along the meosalpinx to 1cm distal to the uterine cornua. The fallopian tube was then transected and the specimen detached and removed through the port. The procedure was repeated on the contralateral side. General Surgery arrived to the room to assess the small bowel areas that were blue-tinged, and believe them to be a variant of normal ileum. Hemostasis was noted. Instruments were removed from the abdomen and the abdomen was relieved of all CO2 gas. The trocars were removed from the abdomen. The skin incisions were then closed with a 4-0 Monocryl stitch and Dermabond. The sponge stick was removed from the vagina. Sponge, lap and needle counts were correct. The patient was taken to the PACU in stable condition.    Margo Benjamin MD  Department of Obstetrics and Gynecology  Formerly [facility]

## 2025-05-23 NOTE — ANESTHESIA PROCEDURE NOTES
Airway    Date/Time: 5/23/2025 1:34 PM    Performed by: Susie Spears M.D.  Authorized by: Susie Spears M.D.    Location:  OR  Urgency:  Elective  Indications for Airway Management:  Anesthesia      Spontaneous Ventilation: absent    Sedation Level:  Deep  Preoxygenated: Yes    Patient Position:  Sniffing  Mask Difficulty Assessment:  1 - vent by mask  Final Airway Type:  Endotracheal airway  Final Endotracheal Airway:  ETT  Cuffed: Yes    Technique Used for Successful ETT Placement:  Direct laryngoscopy  Devices/Methods Used in Placement:  Intubating stylet    Insertion Site:  Oral  Blade Type:  Ilda  Laryngoscope Blade/Videolaryngoscope Blade Size:  3  ETT Size (mm):  7.0  Measured from:  Teeth  ETT to Teeth (cm):  21  Placement Verified by: auscultation and capnometry    Cormack-Lehane Classification:  Grade I - full view of glottis  Number of Attempts at Approach:  1

## 2025-05-23 NOTE — ANESTHESIA POSTPROCEDURE EVALUATION
Patient: Arely Matt    Procedure Summary       Date: 05/23/25 Room / Location: [facility] ROOM 21 / SURGERY SAME DAY [facility]    Anesthesia Start: 1327 Anesthesia Stop: 1430    Procedure: LAPAROSCOPIC BILATERAL SALPINGECTOMY (Abdomen) Diagnosis: (STERILIZATION)    Surgeons: Margo Benjamin M.D. Responsible Provider: Susie Spears M.D.    Anesthesia Type: general ASA Status: 2            Final Anesthesia Type: general  Last vitals  BP   Blood Pressure: 134/85    Temp   36.1 °C (97 °F)    Pulse   65   Resp   13    SpO2   99 %      Anesthesia Post Evaluation    Patient location during evaluation: PACU  Patient participation: complete - patient participated  Level of consciousness: awake and alert    Airway patency: patent  Anesthetic complications: no  Cardiovascular status: hemodynamically stable  Respiratory status: acceptable  Hydration status: euvolemic    PONV: none          No notable events documented.     Nurse Pain Score: 3 (NPRS)

## 2025-05-23 NOTE — H&P
"[facility] WOMEN'S HEALTH  PRE-OPERATIVE VISIT    CC:  No chief complaint on file.      Subjective   HPI: Patient is a 39 y.o.  who presents for scheduled surgery. Transcribed from recent clinic note:    Arely Matt is a 39 y.o. female  who presents today to discuss permanent sterilization. She's previously had two children and is content with the size of her family. She and her partner have discussed vasectomy, however he has significant anxiety, so they believe salpingectomy better option. Denies other concerns today.     Review of Systems   All other systems reviewed and are negative.    Past Medical History[1]  Past Surgical History[2]  Current Medications[3]  Allergies[4]    Objective   Vital Signs:   Vitals:    25 1221   BP: 134/85   Pulse: 65   Resp: 13   Temp: 36.1 °C (97 °F)   TempSrc: Temporal   SpO2: 100%   Weight: 95.3 kg (210 lb 1.6 oz)   Height: 1.753 m (5' 9\")     Body mass index is 31.03 kg/m².    Constitutional:       General: She is not in acute distress.     Appearance: Normal appearance. She is normal weight.   Eyes:      Conjunctiva/sclera: Conjunctivae normal.   Pulmonary:      Effort: Pulmonary effort is normal.   Abdominal:      General: There is no distension.      Palpations: Abdomen is soft. There is no mass.      Tenderness: There is no abdominal tenderness. There is no guarding or rebound.   Skin:     General: Skin is warm and dry.   Neurological:      General: No focal deficit present.      Mental Status: She is alert.   Psychiatric:         Mood and Affect: Mood normal.       Assessment & Plan   Arely Matt is a 39 y.o. female who presents today for permanent sterilization.      #Desires permanent sterilization  - The risks, benefits, alternatives, and indications of permanent sterilization were also reviewed. I discussed that the bilateral salpingectomy or bilateral tubal ligation is considered a permanent procedure and the patient will no longer be able to " bear children following this procedure. Risk of regret in increased in circumstances of young age or low parity. I discussed equally effective and alternative forms of birth control to include pills, barrier methods, Depo-Provera, IUD or male sterilization.  I also discussed the risk of sterilization failure with the patient which is one in 200-300 procedures. We discussed that should the sterilization procedure fail, there is a risk for ectopic pregnancy which may require surgical intervention, or possible intrauterine pregnancy.  I will attempt to remove both tubes completely, but if this can not be accomplished safety due to adhesions or increased vascularity, a partial tubal ligation will be performed.   - The risks, benefits and alternatives of this procedure were discussed with the patient, including but not limited to infection, disfiguring scar, severe loss of blood, venous thrombosis, injury to bowel, bladder or ureter, injury to blood vessels, and rare chance of needing to convert to laparotomy to complete the surgery or any repair.  She was counseled on the permanent/irreversible nature of removal of fallopian tubes for sterilization, the risk of regret,  risk of failure to prevent pregnancy and ectopic pregnancy. She expressed understanding and desire to proceed.     Dispo: to OR for laparoscopic salpingectomy, other indicated procedures    Margo Benjamin M.D.         [1]   Past Medical History:  Diagnosis Date    Disorder of thyroid 05/16/2025    still within range but they found thyroid antibodies so started her on meds; still having follow up to determine diagnosis    Migraine    [2]   Past Surgical History:  Procedure Laterality Date    WRIST ARTHROSCOPY Left     approx age 28   [3]   Current Facility-Administered Medications:     scopolamine (Transderm-Scop) patch 1 Patch, 1 Patch, Transdermal, Q72HRS, Susie Spears M.D., 1 Patch at 05/23/25 1244  [4]   Allergies  Allergen Reactions    Morphine  Hives, Shortness of Breath, Itching and Cough

## 2025-06-05 NOTE — PROGRESS NOTES
"[facility] WOMEN'S HEALTH  POST-OPERATIVE GYNECOLOGY VISIT    Chief Complaint  Post-op    Subjective     Subjective  Arely Matt is a 39 y.o.  s/p laparoscopic bilateral salpingectomy on .  Doing very well postoperatively.  Denies any pain.  Denies any issues with bladder, bowel, p.o. intake.  Planning to return to work today.    Reviewed and updated: PMH, PSH, SH, FH as indicated    Home Medications  Medications Ordered Prior to Encounter[1]    Allergies/Reactions  Allergies[2]    Objective     Physical Examination:  Vital Signs:   Vitals:    06/10/25 0827   BP: 103/61   BP Location: Right arm   Patient Position: Sitting   BP Cuff Size: Large adult   Weight: 210 lb   Height: 5' 9\"     Body mass index is 31.01 kg/m².    Physical Exam  Vitals and nursing note reviewed.   Constitutional:       General: She is not in acute distress.     Appearance: Normal appearance.   Eyes:      Conjunctiva/sclera: Conjunctivae normal.   Pulmonary:      Effort: Pulmonary effort is normal.   Abdominal:      General: There is no distension.      Palpations: Abdomen is soft.      Tenderness: There is no abdominal tenderness.      Comments: 3 laparoscopic incisions well-approximated   Skin:     General: Skin is warm and dry.   Neurological:      Mental Status: She is alert.   Psychiatric:         Mood and Affect: Mood normal.         Behavior: Behavior normal.         Pathology:  A. Left and right fallopian tubes:   Complete cross sections of bilateral fimbriated fallopian tubes with benign paratubal cysts.     Assessment   Arely Matt is a 39 y.o.  s/p laparoscopic bilateral salpingectomy on .     - Postoperative course uncomplicated thus far  - Pathology report and intraoperative images reviewed with patient  - Discussed continued need for annual exams.  Due for next Pap smear in 2027  - Provided clearance letter to return to work    Return: Annual exam    Margo Benjamin M.D.           [1] "   Current Outpatient Medications on File Prior to Visit   Medication Sig Dispense Refill    levothyroxine (SYNTHROID) 25 MCG Tab Take 1 Tablet by mouth every morning on an empty stomach. 90 Tablet 3    rizatriptan (MAXALT) 10 MG tablet Take 1 Tablet by mouth one time as needed for Migraine for up to 30 days. 10 Tablet 11    ibuprofen (MOTRIN) 800 MG Tab Take 1 Tablet by mouth every 8 hours. (Patient not taking: Reported on 6/10/2025) 30 Tablet 0    acetaminophen (TYLENOL) 500 MG Tab Take 2 Tablets by mouth every 8 hours. (Patient not taking: Reported on 6/10/2025) 30 Tablet 0     No current facility-administered medications on file prior to visit.   [2]   Allergies  Allergen Reactions    Morphine Hives, Shortness of Breath, Itching and Cough

## 2025-06-06 ENCOUNTER — TELEPHONE (OUTPATIENT)
Dept: OBGYN | Facility: CLINIC | Age: 40
End: 2025-06-06
Payer: COMMERCIAL

## 2025-06-06 NOTE — TELEPHONE ENCOUNTER
Pt sent my chart message to provider asking for letter to go back to work.  Consulted with providers MA and she said provider approved the letter.

## 2025-06-06 NOTE — LETTER
June 6, 2025       Patient: Arely Matt   YOB: 1985   Date of Visit: 6/6/2025         To Whom It May Concern:    In my medical opinion, I recommend that Arely Matt return to full duty, no restrictions.    If you have any questions or concerns, please don't hesitate to call [phone]          Sincerely,          Margo Benjamin M.D.  Electronically Signed

## 2025-06-10 ENCOUNTER — GYNECOLOGY VISIT (OUTPATIENT)
Dept: OBGYN | Facility: CLINIC | Age: 40
End: 2025-06-10
Payer: COMMERCIAL

## 2025-06-10 VITALS
DIASTOLIC BLOOD PRESSURE: 61 MMHG | WEIGHT: 210 LBS | BODY MASS INDEX: 31.1 KG/M2 | HEIGHT: 69 IN | SYSTOLIC BLOOD PRESSURE: 103 MMHG

## 2025-06-10 DIAGNOSIS — Z09 POSTOP CHECK: Primary | ICD-10-CM

## 2025-06-10 DIAGNOSIS — Z90.79 STATUS POST BILATERAL SALPINGECTOMY: ICD-10-CM

## 2025-06-10 PROBLEM — R63.5 EXCESSIVE WEIGHT GAIN: Status: RESOLVED | Noted: 2022-04-23 | Resolved: 2025-06-10

## 2025-06-10 PROBLEM — Z30.432 ENCOUNTER FOR IUD REMOVAL: Status: RESOLVED | Noted: 2023-06-16 | Resolved: 2025-06-10

## 2025-06-10 PROCEDURE — 99024 POSTOP FOLLOW-UP VISIT: CPT | Performed by: STUDENT IN AN ORGANIZED HEALTH CARE EDUCATION/TRAINING PROGRAM

## 2025-06-10 PROCEDURE — 3078F DIAST BP <80 MM HG: CPT | Performed by: STUDENT IN AN ORGANIZED HEALTH CARE EDUCATION/TRAINING PROGRAM

## 2025-06-10 PROCEDURE — 3074F SYST BP LT 130 MM HG: CPT | Performed by: STUDENT IN AN ORGANIZED HEALTH CARE EDUCATION/TRAINING PROGRAM

## 2025-06-10 ASSESSMENT — FIBROSIS 4 INDEX: FIB4 SCORE: 0.63

## 2025-06-10 NOTE — Clinical Note
Myrna 10, 2025    To Whom It May Concern:         This is confirmation that Arely Shaka attended her scheduled appointment with Margo Benjamin M.D. on 6/10/25.         If you have any questions please do not hesitate to call me at the phone number listed below.    Sincerely,          Margo Benjamin M.D.  [phone]

## 2025-06-10 NOTE — LETTER
Myrna 10, 2025    To Whom It May Concern:         This is confirmation that Arely Matt attended her scheduled appointment with Margo Benjamin M.D. on 6/10/25.  She underwent surgery on 5/23/2025.  At her appointment today, she was cleared to return to normal work activities.         If you have any questions please do not hesitate to call me at the phone number listed below.    Sincerely,          Margo Benjamin M.D.  [phone]

## 2025-06-13 PROBLEM — M24.152 ARTICULAR CARTILAGE DISORDER OF HIP, LEFT: Status: ACTIVE | Noted: 2025-06-13

## 2025-06-13 ASSESSMENT — FIBROSIS 4 INDEX: FIB4 SCORE: 0.63

## 2025-06-24 ENCOUNTER — APPOINTMENT (OUTPATIENT)
Dept: ADMISSIONS | Facility: MEDICAL CENTER | Age: 40
End: 2025-06-24
Attending: ORTHOPAEDIC SURGERY
Payer: COMMERCIAL

## 2025-06-27 ENCOUNTER — PRE-ADMISSION TESTING (OUTPATIENT)
Dept: ADMISSIONS | Facility: MEDICAL CENTER | Age: 40
End: 2025-06-27
Attending: ORTHOPAEDIC SURGERY
Payer: COMMERCIAL

## 2025-06-27 NOTE — PREADMIT AVS NOTE
Current Medications   Medication Instructions    levothyroxine (SYNTHROID) 25 MCG Tab Continue taking as prescribed.    rizatriptan (MAXALT) 10 MG tablet Hold medication day of procedure

## 2025-07-09 ENCOUNTER — ANESTHESIA EVENT (OUTPATIENT)
Dept: SURGERY | Facility: MEDICAL CENTER | Age: 40
End: 2025-07-09
Payer: COMMERCIAL

## 2025-07-10 ENCOUNTER — ANESTHESIA (OUTPATIENT)
Dept: SURGERY | Facility: MEDICAL CENTER | Age: 40
End: 2025-07-10
Payer: COMMERCIAL

## 2025-07-10 ENCOUNTER — APPOINTMENT (OUTPATIENT)
Dept: RADIOLOGY | Facility: MEDICAL CENTER | Age: 40
End: 2025-07-10
Attending: ORTHOPAEDIC SURGERY
Payer: COMMERCIAL

## 2025-07-10 ENCOUNTER — HOSPITAL ENCOUNTER (OUTPATIENT)
Facility: MEDICAL CENTER | Age: 40
End: 2025-07-10
Attending: ORTHOPAEDIC SURGERY | Admitting: ORTHOPAEDIC SURGERY
Payer: COMMERCIAL

## 2025-07-10 VITALS
BODY MASS INDEX: 31.74 KG/M2 | SYSTOLIC BLOOD PRESSURE: 125 MMHG | WEIGHT: 209.44 LBS | OXYGEN SATURATION: 99 % | DIASTOLIC BLOOD PRESSURE: 73 MMHG | HEART RATE: 91 BPM | TEMPERATURE: 97.5 F | HEIGHT: 68 IN | RESPIRATION RATE: 16 BRPM

## 2025-07-10 DIAGNOSIS — M24.152 ARTICULAR CARTILAGE DISORDER OF HIP, LEFT: Primary | ICD-10-CM

## 2025-07-10 LAB — HCG UR QL: NEGATIVE

## 2025-07-10 PROCEDURE — A9270 NON-COVERED ITEM OR SERVICE: HCPCS | Performed by: ANESTHESIOLOGY

## 2025-07-10 PROCEDURE — 160042 HCHG SURGERY MINUTES - EA ADDL 1 MIN LEVEL 5: Performed by: ORTHOPAEDIC SURGERY

## 2025-07-10 PROCEDURE — 160193 HCHG PACU STANDARD - 1ST 60 MINS: Performed by: ORTHOPAEDIC SURGERY

## 2025-07-10 PROCEDURE — 160015 HCHG STAT PREOP MINUTES: Performed by: ORTHOPAEDIC SURGERY

## 2025-07-10 PROCEDURE — 700111 HCHG RX REV CODE 636 W/ 250 OVERRIDE (IP): Performed by: ORTHOPAEDIC SURGERY

## 2025-07-10 PROCEDURE — C1713 ANCHOR/SCREW BN/BN,TIS/BN: HCPCS | Performed by: ORTHOPAEDIC SURGERY

## 2025-07-10 PROCEDURE — 84703 CHORIONIC GONADOTROPIN ASSAY: CPT | Mod: QW | Performed by: ORTHOPAEDIC SURGERY

## 2025-07-10 PROCEDURE — 73501 X-RAY EXAM HIP UNI 1 VIEW: CPT | Mod: LT

## 2025-07-10 PROCEDURE — 29863 HIP ARTHR0 W/SYNOVECTOMY: CPT | Mod: LT | Performed by: ORTHOPAEDIC SURGERY

## 2025-07-10 PROCEDURE — 29916 HIP ARTHRO W/LABRAL REPAIR: CPT | Mod: LT | Performed by: ORTHOPAEDIC SURGERY

## 2025-07-10 PROCEDURE — 700111 HCHG RX REV CODE 636 W/ 250 OVERRIDE (IP): Mod: JZ | Performed by: ANESTHESIOLOGY

## 2025-07-10 PROCEDURE — 160191 HCHG ANESTHESIA STANDARD: Performed by: ORTHOPAEDIC SURGERY

## 2025-07-10 PROCEDURE — 700101 HCHG RX REV CODE 250: Performed by: ANESTHESIOLOGY

## 2025-07-10 PROCEDURE — 700102 HCHG RX REV CODE 250 W/ 637 OVERRIDE(OP): Performed by: ANESTHESIOLOGY

## 2025-07-10 PROCEDURE — 160031 HCHG SURGERY MINUTES - 1ST 30 MINS LEVEL 5: Performed by: ORTHOPAEDIC SURGERY

## 2025-07-10 PROCEDURE — 160025 RECOVERY II MINUTES (STATS): Performed by: ORTHOPAEDIC SURGERY

## 2025-07-10 PROCEDURE — 700105 HCHG RX REV CODE 258: Performed by: ORTHOPAEDIC SURGERY

## 2025-07-10 PROCEDURE — 29914 HIP ARTHRO W/FEMOROPLASTY: CPT | Mod: ASROC,LT | Performed by: PHYSICIAN ASSISTANT

## 2025-07-10 PROCEDURE — 29863 HIP ARTHR0 W/SYNOVECTOMY: CPT | Mod: ASROC,LT | Performed by: PHYSICIAN ASSISTANT

## 2025-07-10 PROCEDURE — 29916 HIP ARTHRO W/LABRAL REPAIR: CPT | Mod: ASROC,LT | Performed by: PHYSICIAN ASSISTANT

## 2025-07-10 PROCEDURE — 160047 HCHG PACU  - EA ADDL 30 MINS PHASE II: Performed by: ORTHOPAEDIC SURGERY

## 2025-07-10 PROCEDURE — 160002 HCHG RECOVERY MINUTES (STAT): Performed by: ORTHOPAEDIC SURGERY

## 2025-07-10 PROCEDURE — 700101 HCHG RX REV CODE 250: Performed by: ORTHOPAEDIC SURGERY

## 2025-07-10 PROCEDURE — 160048 HCHG OR STATISTICAL LEVEL 1-5: Performed by: ORTHOPAEDIC SURGERY

## 2025-07-10 PROCEDURE — 160046 HCHG PACU - 1ST 60 MINS PHASE II: Performed by: ORTHOPAEDIC SURGERY

## 2025-07-10 PROCEDURE — 29914 HIP ARTHRO W/FEMOROPLASTY: CPT | Mod: LT | Performed by: ORTHOPAEDIC SURGERY

## 2025-07-10 DEVICE — ANCHOR SUTURE NANOTACK XBRAID TITANIUM.2MM TAPE 1.4MM(1EA): Type: IMPLANTABLE DEVICE | Site: HIP | Status: FUNCTIONAL

## 2025-07-10 RX ORDER — EPHEDRINE SULFATE 50 MG/ML
INJECTION, SOLUTION INTRAVENOUS PRN
Status: DISCONTINUED | OUTPATIENT
Start: 2025-07-10 | End: 2025-07-10 | Stop reason: SURG

## 2025-07-10 RX ORDER — ROPIVACAINE HYDROCHLORIDE 5 MG/ML
INJECTION, SOLUTION EPIDURAL; INFILTRATION; PERINEURAL
Status: DISCONTINUED | OUTPATIENT
Start: 2025-07-10 | End: 2025-07-10 | Stop reason: HOSPADM

## 2025-07-10 RX ORDER — DIPHENHYDRAMINE HYDROCHLORIDE 50 MG/ML
12.5 INJECTION, SOLUTION INTRAMUSCULAR; INTRAVENOUS
Status: DISCONTINUED | OUTPATIENT
Start: 2025-07-10 | End: 2025-07-10 | Stop reason: HOSPADM

## 2025-07-10 RX ORDER — ALBUTEROL SULFATE 5 MG/ML
2.5 SOLUTION RESPIRATORY (INHALATION)
Status: DISCONTINUED | OUTPATIENT
Start: 2025-07-10 | End: 2025-07-10 | Stop reason: HOSPADM

## 2025-07-10 RX ORDER — SODIUM CHLORIDE, SODIUM LACTATE, POTASSIUM CHLORIDE, CALCIUM CHLORIDE 600; 310; 30; 20 MG/100ML; MG/100ML; MG/100ML; MG/100ML
INJECTION, SOLUTION INTRAVENOUS CONTINUOUS
Status: DISCONTINUED | OUTPATIENT
Start: 2025-07-10 | End: 2025-07-10 | Stop reason: HOSPADM

## 2025-07-10 RX ORDER — CEFAZOLIN SODIUM 1 G/3ML
2 INJECTION, POWDER, FOR SOLUTION INTRAMUSCULAR; INTRAVENOUS ONCE
Status: COMPLETED | OUTPATIENT
Start: 2025-07-10 | End: 2025-07-10

## 2025-07-10 RX ORDER — MAGNESIUM HYDROXIDE 1200 MG/15ML
LIQUID ORAL
Status: COMPLETED | OUTPATIENT
Start: 2025-07-10 | End: 2025-07-10

## 2025-07-10 RX ORDER — ACETAMINOPHEN 500 MG
1000 TABLET ORAL ONCE
Status: COMPLETED | OUTPATIENT
Start: 2025-07-10 | End: 2025-07-10

## 2025-07-10 RX ORDER — OXYCODONE HCL 5 MG/5 ML
5 SOLUTION, ORAL ORAL
Status: COMPLETED | OUTPATIENT
Start: 2025-07-10 | End: 2025-07-10

## 2025-07-10 RX ORDER — HALOPERIDOL 5 MG/ML
1 INJECTION INTRAMUSCULAR
Status: DISCONTINUED | OUTPATIENT
Start: 2025-07-10 | End: 2025-07-10 | Stop reason: HOSPADM

## 2025-07-10 RX ORDER — HYDROMORPHONE HYDROCHLORIDE 1 MG/ML
0.1 INJECTION, SOLUTION INTRAMUSCULAR; INTRAVENOUS; SUBCUTANEOUS
Status: DISCONTINUED | OUTPATIENT
Start: 2025-07-10 | End: 2025-07-10 | Stop reason: HOSPADM

## 2025-07-10 RX ORDER — METHOCARBAMOL 100 MG/ML
1000 INJECTION, SOLUTION INTRAMUSCULAR; INTRAVENOUS
Status: DISCONTINUED | OUTPATIENT
Start: 2025-07-10 | End: 2025-07-10 | Stop reason: HOSPADM

## 2025-07-10 RX ORDER — TRANEXAMIC ACID 100 MG/ML
INJECTION, SOLUTION INTRAVENOUS PRN
Status: DISCONTINUED | OUTPATIENT
Start: 2025-07-10 | End: 2025-07-10 | Stop reason: SURG

## 2025-07-10 RX ORDER — SCOPOLAMINE 1 MG/3D
1 PATCH, EXTENDED RELEASE TRANSDERMAL
Status: DISCONTINUED | OUTPATIENT
Start: 2025-07-10 | End: 2025-07-10 | Stop reason: HOSPADM

## 2025-07-10 RX ORDER — OXYCODONE HCL 5 MG/5 ML
10 SOLUTION, ORAL ORAL
Status: COMPLETED | OUTPATIENT
Start: 2025-07-10 | End: 2025-07-10

## 2025-07-10 RX ORDER — ONDANSETRON 2 MG/ML
INJECTION INTRAMUSCULAR; INTRAVENOUS PRN
Status: DISCONTINUED | OUTPATIENT
Start: 2025-07-10 | End: 2025-07-10 | Stop reason: SURG

## 2025-07-10 RX ORDER — ONDANSETRON 2 MG/ML
4 INJECTION INTRAMUSCULAR; INTRAVENOUS
Status: DISCONTINUED | OUTPATIENT
Start: 2025-07-10 | End: 2025-07-10 | Stop reason: HOSPADM

## 2025-07-10 RX ORDER — HYDROMORPHONE HYDROCHLORIDE 1 MG/ML
0.4 INJECTION, SOLUTION INTRAMUSCULAR; INTRAVENOUS; SUBCUTANEOUS
Status: DISCONTINUED | OUTPATIENT
Start: 2025-07-10 | End: 2025-07-10 | Stop reason: HOSPADM

## 2025-07-10 RX ORDER — EPHEDRINE SULFATE 50 MG/ML
5 INJECTION, SOLUTION INTRAVENOUS
Status: DISCONTINUED | OUTPATIENT
Start: 2025-07-10 | End: 2025-07-10 | Stop reason: HOSPADM

## 2025-07-10 RX ORDER — ROCURONIUM BROMIDE 10 MG/ML
INJECTION, SOLUTION INTRAVENOUS PRN
Status: DISCONTINUED | OUTPATIENT
Start: 2025-07-10 | End: 2025-07-10 | Stop reason: SURG

## 2025-07-10 RX ORDER — LABETALOL HYDROCHLORIDE 5 MG/ML
5 INJECTION, SOLUTION INTRAVENOUS
Status: DISCONTINUED | OUTPATIENT
Start: 2025-07-10 | End: 2025-07-10 | Stop reason: HOSPADM

## 2025-07-10 RX ORDER — KETOROLAC TROMETHAMINE 15 MG/ML
INJECTION, SOLUTION INTRAMUSCULAR; INTRAVENOUS PRN
Status: DISCONTINUED | OUTPATIENT
Start: 2025-07-10 | End: 2025-07-10 | Stop reason: SURG

## 2025-07-10 RX ORDER — HYDRALAZINE HYDROCHLORIDE 20 MG/ML
5 INJECTION INTRAMUSCULAR; INTRAVENOUS
Status: DISCONTINUED | OUTPATIENT
Start: 2025-07-10 | End: 2025-07-10 | Stop reason: HOSPADM

## 2025-07-10 RX ORDER — LIDOCAINE HYDROCHLORIDE 20 MG/ML
INJECTION, SOLUTION EPIDURAL; INFILTRATION; INTRACAUDAL; PERINEURAL PRN
Status: DISCONTINUED | OUTPATIENT
Start: 2025-07-10 | End: 2025-07-10 | Stop reason: SURG

## 2025-07-10 RX ORDER — HYDROMORPHONE HYDROCHLORIDE 1 MG/ML
0.2 INJECTION, SOLUTION INTRAMUSCULAR; INTRAVENOUS; SUBCUTANEOUS
Status: DISCONTINUED | OUTPATIENT
Start: 2025-07-10 | End: 2025-07-10 | Stop reason: HOSPADM

## 2025-07-10 RX ORDER — SODIUM CHLORIDE, SODIUM LACTATE, POTASSIUM CHLORIDE, CALCIUM CHLORIDE 600; 310; 30; 20 MG/100ML; MG/100ML; MG/100ML; MG/100ML
INJECTION, SOLUTION INTRAVENOUS CONTINUOUS
Status: ACTIVE | OUTPATIENT
Start: 2025-07-10 | End: 2025-07-10

## 2025-07-10 RX ORDER — DEXAMETHASONE SODIUM PHOSPHATE 4 MG/ML
INJECTION, SOLUTION INTRA-ARTICULAR; INTRALESIONAL; INTRAMUSCULAR; INTRAVENOUS; SOFT TISSUE PRN
Status: DISCONTINUED | OUTPATIENT
Start: 2025-07-10 | End: 2025-07-10 | Stop reason: SURG

## 2025-07-10 RX ORDER — MIDAZOLAM HYDROCHLORIDE 1 MG/ML
1 INJECTION INTRAMUSCULAR; INTRAVENOUS
Status: DISCONTINUED | OUTPATIENT
Start: 2025-07-10 | End: 2025-07-10 | Stop reason: HOSPADM

## 2025-07-10 RX ORDER — METOCLOPRAMIDE HYDROCHLORIDE 5 MG/ML
INJECTION INTRAMUSCULAR; INTRAVENOUS PRN
Status: DISCONTINUED | OUTPATIENT
Start: 2025-07-10 | End: 2025-07-10 | Stop reason: SURG

## 2025-07-10 RX ORDER — HYDROMORPHONE HYDROCHLORIDE 2 MG/ML
INJECTION, SOLUTION INTRAMUSCULAR; INTRAVENOUS; SUBCUTANEOUS PRN
Status: DISCONTINUED | OUTPATIENT
Start: 2025-07-10 | End: 2025-07-10 | Stop reason: SURG

## 2025-07-10 RX ORDER — MEPERIDINE HYDROCHLORIDE 25 MG/ML
6.25 INJECTION INTRAMUSCULAR; INTRAVENOUS; SUBCUTANEOUS
Status: DISCONTINUED | OUTPATIENT
Start: 2025-07-10 | End: 2025-07-10 | Stop reason: HOSPADM

## 2025-07-10 RX ADMIN — LIDOCAINE HYDROCHLORIDE 100 MG: 20 INJECTION, SOLUTION EPIDURAL; INFILTRATION; INTRACAUDAL; PERINEURAL at 10:33

## 2025-07-10 RX ADMIN — FENTANYL CITRATE 50 MCG: 50 INJECTION, SOLUTION INTRAMUSCULAR; INTRAVENOUS at 12:40

## 2025-07-10 RX ADMIN — EPHEDRINE SULFATE 5 MG: 50 INJECTION, SOLUTION INTRAVENOUS at 11:54

## 2025-07-10 RX ADMIN — ROCURONIUM BROMIDE 10 MG: 10 INJECTION INTRAVENOUS at 11:12

## 2025-07-10 RX ADMIN — KETOROLAC TROMETHAMINE 15 MG: 15 INJECTION, SOLUTION INTRAMUSCULAR; INTRAVENOUS at 12:00

## 2025-07-10 RX ADMIN — ROCURONIUM BROMIDE 50 MG: 10 INJECTION INTRAVENOUS at 10:33

## 2025-07-10 RX ADMIN — SODIUM CHLORIDE, POTASSIUM CHLORIDE, SODIUM LACTATE AND CALCIUM CHLORIDE: 600; 310; 30; 20 INJECTION, SOLUTION INTRAVENOUS at 09:49

## 2025-07-10 RX ADMIN — TRANEXAMIC ACID 1000 MG: 100 INJECTION, SOLUTION INTRAVENOUS at 10:37

## 2025-07-10 RX ADMIN — FENTANYL CITRATE 50 MCG: 50 INJECTION, SOLUTION INTRAMUSCULAR; INTRAVENOUS at 10:32

## 2025-07-10 RX ADMIN — DEXAMETHASONE SODIUM PHOSPHATE 8 MG: 4 INJECTION INTRA-ARTICULAR; INTRALESIONAL; INTRAMUSCULAR; INTRAVENOUS; SOFT TISSUE at 10:33

## 2025-07-10 RX ADMIN — CEFAZOLIN 2 G: 1 INJECTION, POWDER, FOR SOLUTION INTRAMUSCULAR; INTRAVENOUS at 10:36

## 2025-07-10 RX ADMIN — OXYCODONE HYDROCHLORIDE 10 MG: 5 SOLUTION ORAL at 12:28

## 2025-07-10 RX ADMIN — PROPOFOL 30 MCG/KG/MIN: 10 INJECTION, EMULSION INTRAVENOUS at 10:38

## 2025-07-10 RX ADMIN — HYDROMORPHONE HYDROCHLORIDE 0.4 MG: 2 INJECTION INTRAMUSCULAR; INTRAVENOUS; SUBCUTANEOUS at 12:00

## 2025-07-10 RX ADMIN — FENTANYL CITRATE 25 MCG: 50 INJECTION, SOLUTION INTRAMUSCULAR; INTRAVENOUS at 13:05

## 2025-07-10 RX ADMIN — ONDANSETRON 4 MG: 2 INJECTION INTRAMUSCULAR; INTRAVENOUS at 12:00

## 2025-07-10 RX ADMIN — SCOPOLAMINE 1 PATCH: 1.5 PATCH, EXTENDED RELEASE TRANSDERMAL at 09:50

## 2025-07-10 RX ADMIN — ACETAMINOPHEN 1000 MG: 500 TABLET ORAL at 09:49

## 2025-07-10 RX ADMIN — HYDROMORPHONE HYDROCHLORIDE 0.6 MG: 2 INJECTION INTRAMUSCULAR; INTRAVENOUS; SUBCUTANEOUS at 11:12

## 2025-07-10 RX ADMIN — PROPOFOL 200 MG: 10 INJECTION, EMULSION INTRAVENOUS at 10:33

## 2025-07-10 RX ADMIN — METOCLOPRAMIDE HYDROCHLORIDE 10 MG: 5 INJECTION INTRAMUSCULAR; INTRAVENOUS at 10:33

## 2025-07-10 RX ADMIN — MEPERIDINE HYDROCHLORIDE 6.25 MG: 25 INJECTION INTRAMUSCULAR; INTRAVENOUS; SUBCUTANEOUS at 13:59

## 2025-07-10 RX ADMIN — FENTANYL CITRATE 50 MCG: 50 INJECTION, SOLUTION INTRAMUSCULAR; INTRAVENOUS at 12:28

## 2025-07-10 ASSESSMENT — PAIN DESCRIPTION - PAIN TYPE
TYPE: ACUTE PAIN;SURGICAL PAIN
TYPE: ACUTE PAIN;SURGICAL PAIN
TYPE: ACUTE PAIN
TYPE: ACUTE PAIN;SURGICAL PAIN
TYPE: SURGICAL PAIN

## 2025-07-10 ASSESSMENT — FIBROSIS 4 INDEX: FIB4 SCORE: 0.63

## 2025-07-10 NOTE — LETTER
"     June 19, 2025    Patient Name: Arely Matt  Surgeon Name: Barry Russell M.D.  Surgery [facility] [facility]  Surgery Date: 7/10/2025    The time of your surgery is not final and may change up to and until the day of your surgery. You will be contacted 24-48 hours prior to your surgery date with your check-in and surgery time.    If you will not be at one of the below numbers please call the surgery scheduler at [phone]  Preferred Phone: [phone]    BEFORE YOUR SURGERY   Pre Registration and/or Lab Work must be done within and no earlier than 28 days prior to your surgery date. Your scheduled facility will contact you regarding all required preregistration and/or lab work. If you have not been contacted within 7 days of your scheduled procedure please call [facility] at [phone] for an appointment as soon as possible.    Instructions: Bring a list of all medications you are taking including the dosing and frequency.    DAY OF YOUR SURGERY  Nothing to eat or drink after midnight the night before surgery. This includes mints, gums, etc.     Refrain from smoking any substance or consuming any tobacco products after midnight prior to surgery. It may interfere with the anesthetic and frequently produces nausea during the recovery period.    Continue taking all lifesaving medications. Including the morning of your surgery with small sip of water.  If you have questions regarding what medication you can take on the day of surgery, please contact the preadmit department ([phone]).    Please do NOT take on the day of surgery:  Any diabetic medications  Diuretics: examples- Furosemide (Lasix), Spironolactone, Hydrochlorothiazide.   Ace Inhibitors: examples - Lisinopril, Ramipril, Enalapril  \"ARB's\": examples: Losartan, Olmesartan, Valsartan    Please arrive at the hospital/surgery center at the check-in time " provided.   An adult will need to bring you and take you home after your surgery.     AFTER YOUR SURGERY  Post op Appointment:   Date: 7/23/2025   Time: 1:40PM   With: Barry Russell MD   Location: [address] [place] [address]    - Therapy- Your first appointment should be 10-12  day(s) after your surgery. For your convenience we have 4 Physical Therapy locations: [place], [facility], [place], and [facility]. Call our office ASAP to schedule an appointment at [phone] or take the enclosed Therapy Prescription to a facility of your choice.  - You must have someone provide transportation post surgery and someone to monitor you for at least 24 hours post-surgery. If you don't have either of these your appointment will be canceled.     TIME OFF WORK  FMLA or Disability forms can be faxed directly to: [phone] or you may drop them off at [address] [place] [facility] [place] [address]. Our office charges a $35.00 fee per form. Forms will be completed within 10 business days of drop off and payment received. For the status of your forms you may contact our disability office directly [phone].    MEDICATION INSTRUCTIONS **Please read section completely**    Please consult your prescribing physician if you are on life saving blood thinners (Plavix, Coumadin, Eliquis, Xarelto, etc.) for when to stop prior to surgery    The following medications should be stopped a minimum of 14 days prior to surgery:    Anorectics: Phentermine (Adipex-P, Lomaira and Suprenza), Phentermine-topiramate (Qsymia),   Bupropion-naltrexone (Contrave)    **If you are on Bupropion for anxiety/depression, please continue this medication up until the day of surgery.     The following should be stopped a minimum of 7 days prior to surgery:  All vitamins and supplements  Ozempic, Trulicity, Victoza    The following medications should be stopped 5 days prior to surgery:  Anti-Inflammatories: examples- Aspirin, Aspirin products,  Ibuprofen/Advil, Aleve, Naproxen, Meloxicam, Celebrex, etc.    The following medications should be stopped 4 days prior to surgery:  Certain oral diabetic medications: ertugliflozin (Steglatro)    The following medications should be stopped a minimum of 3 days prior to surgery:  Certain oral diabetic medications: canagliflozin (Invokana), dapagliflozin (Farxiga), empagliflozin (Jardiance)  Opiod Partial Agonists/Opioid Antagonists: Buprenorphine (Suboxone, Belbuca, Butrans, Probuphine Implant, Sublocade), Naltrexone (ReVia, Vivitrol), Naloxone  PDE-5 inhibitors: Sildenafil (Viagra), Tadalafil (Cialis), Vardenafil (Levitra), Avanafil (Stendra)  MAO Inhibitors: Rasagiline (Azilect), Selegiline (Eldepryl, Emsam, Selapar), Isocarboxazid (Marplan), Phenelzine (Nardil)         Thank you,     [facility]    Contact Us:  [facility] [place]   [phone]  Web: [facility]

## 2025-07-10 NOTE — DISCHARGE INSTR - OTHER INFO
Dr. Russell Discharge Instructions  (Hip)    Contact Info: If you have any questions or concerns, please contact [facility] at [phone] during normal business hours (Monday-Friday: 8:00am-5:00pm) or the main office number at [phone] after normal business hours.     Diet: Resume your regular diet slowly and as tolerated.      Icing/Elevating: Apply ice to the operative hip near full time for the first 5 days following surgery.  Be sure to have a surgical dressing or towel between the ice and skin.  After 5 days, you should apply ice for only 10 minutes, 2-3 times per day.      Medications: Resume your home medications as normal, unless otherwise instructed.    Over-the-Counter medication (Tylenol): Take Tylenol 650 mg (two 325 mg tablets) every 6 hours for 3 days after surgery.    Anti-inflammatory medication (Naproxen): Take the prescription anti-inflammatory medication for 2 weeks following surgery.  This medication will help with inflammation and also prevent extra bone from forming around your hip (heterotopic ossification).    Blood clot (DVT) prophylaxis medication (Aspirin): Take an enteric-coated baby Aspirin (one 81 mg tablet) twice daily for 2 weeks following surgery to prevent blood clots (deep venous thrombosis/DVT).  If you develop chest pain, shortness of breath, and/or leg pain, swelling, or redness, you should contact our office or go to the urgent care/ER immediately.  Narcotic pain medication (Percocet, Norco, Oxycodone, etc.): Take the narcotic pain medication as needed for breakthrough pain, as instructed on the medication bottle.  DO NOT drive, drink alcohol, or sign important documents while taking narcotic pain medications.    Anti-nausea medication (Zofran/Ondansetron, Phenergan, etc.): Take the anti-nausea medication if you are feeling ill or nauseated after surgery.      Compression Stockings: You may wake up from surgery with compression stockings placed on the  operative lower extremity only or on both lower extremities.  These provide compression around the thigh and leg to prevent blood clots and help improve circulation following surgery.  The compression stockings can be removed with the surgical dressing 3 days after surgery, but should then be reapplied.  The compression stockings should be worn while on crutches, while sedentary, or for approximately 2 weeks following surgery depending on restrictions.      Showering/Dressing: You may remove the surgical dressing and compression stocking 3 days after surgery.   You will see Steri-Strips (white stickers) covering the sutures and incisions - these will stay on until your first postoperative appointment.  Once the surgical dressing has been removed, you may shower as normal.  Let water run freely over the incisions and then pat the hip dry with a clean towel.  You may then leave the hip open to the air, or you may cover the hip with a simple fresh dry dressing or ACE wrap.  DO NOT scrub the incisions.  DO NOT apply soap, ointments, lotions, or Bacitracin on the incisions for at least 4-6 weeks after surgery.  DO NOT submerge the operative hip in a bath, pool, river, hot tub, lake, etc. for at least 4-6 weeks after surgery.      Activity: You will remain flat foot partial weightbearing on the operative extremity with crutches and a hip abduction brace in place for 4-6 weeks after surgery, depending on the specific procedures performed.  The brace will be locked from 0-90 degrees and should be in place when you are up and moving around.  You should AVOID toe-touch weightbearing or non-weightbearing on the operative extremity as this can cause inflammation of the soft tissues around the hip joint.      Physical Therapy: Physical therapy will be started approximately 8-10 days after surgery, unless instructed otherwise.  The physical therapy prescription will be in your surgery letter/packet.      Driving: You may resume  driving when you feel comfortable and safe doing so and when you are off all narcotic pain medications.  If you had a RIGHT hip surgery, you will not be able to drive until you are fully weightbearing without assistance.      Follow-Up: You will be seen back in clinic for your first postoperative appointment 8-12 days after surgery.  This appointment should already be scheduled and the appointment time/date should be in your surgery letter/packet.  Your sutures will likely be removed at this appointment, and X-rays will also sometimes be performed.      PROBLEMS  Reasons to contact Dr. Russell's office immediately include:  Fevers over 101.3°F (38.5°C) consistently, chills, sweats   Increased drainage from or swelling around the incisions  Excessive bleeding (dressing is saturated)  Excessive redness around incisions  Discomfort and/or swelling in the lower leg and calf  Chest pain and/or shortness of breath  Pain not controlled by pain medication  Swelling that is accompanied by coolness or decreased sensation in the knee, leg, ankle, or foot  Persistent nausea or vomiting

## 2025-07-10 NOTE — ANESTHESIA TIME REPORT
Anesthesia Start and Stop Event Times       Date Time Event    7/10/2025 1015 Ready for Procedure     1029 Anesthesia Start     1221 Anesthesia Stop          Responsible Staff  07/10/25      Name Role Begin End    Dawn Ji M.D. Anesth 1029 1221          Overtime Reason:  no overtime (within assigned shift)    Comments:                                                           Additional Safety/Bands:

## 2025-07-10 NOTE — H&P
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 female who presented with left hip pain.    Past Medical History[1]    Past Surgical History[2]    Current Medications[3]    Social History     Socioeconomic History    Marital status:      Spouse name: Not on file    Number of children: Not on file    Years of education: Not on file    Highest education level: Associate degree: academic program   Occupational History    Not on file   Tobacco Use    Smoking status: Never    Smokeless tobacco: Never   Vaping Use    Vaping status: Never Used   Substance and Sexual Activity    Alcohol use: Yes     Comment: occasional, 1-2 drinks per month    Drug use: Never    Sexual activity: Not Currently     Partners: Male     Birth control/protection: Abstinence   Other Topics Concern    Not on file   Social History Narrative    Not on file     Social Drivers of Health     Financial Resource Strain: Low Risk  (4/21/2022)    Overall Financial Resource Strain (CARDIA)     Difficulty of Paying Living Expenses: Not very hard   Food Insecurity: No Food Insecurity (4/21/2022)    Hunger Vital Sign     Worried About Running Out of Food in the Last Year: Never true     Ran Out of Food in the Last Year: Never true   Transportation Needs: No Transportation Needs (4/21/2022)    PRAPARE - Transportation     Lack of Transportation (Medical): No     Lack of Transportation (Non-Medical): No   Physical Activity: Insufficiently Active (4/21/2022)    Exercise Vital Sign     Days of Exercise per Week: 3 days     Minutes of Exercise per Session: 30 min   Stress: Stress Concern Present (4/21/2022)    [place] [employer] - Occupational Stress Questionnaire     Feeling of Stress : Rather much   Social Connections: Socially Isolated (4/21/2022)    Social  Connection and Isolation Panel [NHANES]     Frequency of Communication with Friends and Family: Never     Frequency of Social Gatherings with Friends and Family: Never     Attends Caodaism Services: Never     Active Member of Clubs or Organizations: No     Attends Club or Organization Meetings: Never     Marital Status:    Intimate Partner Violence: Not on file   Housing Stability: High Risk (4/21/2022)    Housing Stability Vital Sign     Unable to Pay for Housing in the Last Year: No     Number of Places Lived in the Last Year: 3     Unstable Housing in the Last Year: No       Family History   Problem Relation Age of Onset    Diabetes Mother     Hypertension Mother     Alcohol abuse Mother     Stroke Mother         at 49    Thyroid Mother     Psychiatric Illness Mother         Bipolar    Drug abuse Mother     No Known Problems Father     Breast Cancer Maternal Grandmother         Mastectomy at 38    Psychiatric Illness Maternal Grandmother         Bipolar    Heart Disease Paternal Grandfather     Autism Brother        Allergies  Morphine    Review of Systems  Negative    Physical Exam  Exam of the left hip demonstrates no deformity. Negative logroll. Negative Stinchfield. Range of motion demonstrates hip flexion to 105 degrees, internal rotation to about 30 degrees, and external rotation to approximately 45 degrees. No real pain with anterior impingement but some discomfort deep in the groin with labral stress test. Negative MARQUITA test. No tenderness over the trochanter. Neurovascular intact.     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]  Procedure(s):  LEFT HIP  ARTHROSCOPY, LABRAL REPAIR, FEMORAL NECK OSTEOPLASTY, REPAIRS AS INDICATED  OSTEOPLASTY, FEMUR, NECK         [1]   Past Medical History:  Diagnosis Date    Disorder of thyroid 05/16/2025    still within range but they found thyroid antibodies so started her on meds; still having follow up to determine diagnosis    Migraine    [2]   Past Surgical History:  Procedure Laterality Date    VT LAP,DIAGNOSTIC ABDOMEN N/A 05/23/2025    Procedure: LAPAROSCOPIC BILATERAL SALPINGECTOMY;  Surgeon: Margo Benjamin M.D.;  Location: SURGERY SAME DAY [facility];  Service: Gynecology    GYN SURGERY  5/23/25    Bilateral Salpingectomy    WRIST ARTHROSCOPY Left     approx age 28   [3]   Current Facility-Administered Medications   Medication Dose Route Frequency Provider Last Rate Last Admin    scopolamine (Transderm-Scop) patch 1 Patch  1 Patch Transdermal Q72HRS Dawn Ji M.D.   1 Patch at 07/10/25 0950    lidocaine (Xylocaine) 1 % injection 0.5 mL  0.5 mL Intradermal Once PRN Barry Russell M.D.        lactated ringers infusion   Intravenous Continuous Barry Russell M.D. 10 mL/hr at 07/10/25 0949 New Bag at 07/10/25 0949    ceFAZolin (Ancef) injection 2 g  2 g Intravenous Once Barry Russell M.D.

## 2025-07-10 NOTE — ANESTHESIA PROCEDURE NOTES
Airway    Date/Time: 7/10/2025 10:35 AM    Performed by: Dawn Ji M.D.  Authorized by: Dawn Ji M.D.    Location:  OR  Urgency:  Elective  Indications for Airway Management:  Anesthesia      Spontaneous Ventilation: absent    Sedation Level:  Deep  Preoxygenated: Yes    Patient Position:  Sniffing  Final Airway Type:  Endotracheal airway  Final Endotracheal Airway:  ETT  Cuffed: Yes    Technique Used for Successful ETT Placement:  Direct laryngoscopy    Insertion Site:  Oral  Blade Type:  Ilda  Laryngoscope Blade/Videolaryngoscope Blade Size:  3  ETT Size (mm):  6.5  Measured from:  Teeth  ETT to Teeth (cm):  22  Placement Verified by: auscultation and capnometry    Cormack-Lehane Classification:  Grade I - full view of glottis  Number of Attempts at Approach:  1

## 2025-07-10 NOTE — OR NURSING
Pt admitted to pre-op, surgical consent form signed, baseline vital signs obtained. Pre-op questions and tasks completed, documented in EPIC. Reviewed pt's medication list and preferred pharmacy. Pt changed into surgical attire, TEDS and SCDs placed.

## 2025-07-10 NOTE — OR NURSING
1337: Patient arrived to phase II from PACU 1 via gurney. Report received from Aidee TRACY. Respirations are spontaneous and unlabored.   Pt shivering and reporting 5/10 pain to left hip.    1340: Family at bedside.     1359- administered demerol per Mar.     1410- report given to Laya TRACY   1443- report received from Laya TRACY    1445: Patient education completed, family denies further questions. IV removed with tip intact. DC'd to care of responsible adult.

## 2025-07-10 NOTE — DISCHARGE INSTRUCTIONS
If any questions arise, call your provider.  If your provider is not available, please feel free to call the Surgical Center at [phone].    MEDICATIONS: Resume taking daily medication.  Take prescribed pain medication with food.  If no medication is prescribed, you may take non-aspirin pain medication if needed.  PAIN MEDICATION CAN BE VERY CONSTIPATING.  Take a stool softener or laxative such as senokot, pericolace, or milk of magnesia if needed.    Last pain medication given at 1228 10 mg oxycodone    What to Expect Post Anesthesia    Rest and take it easy for the first 24 hours.  A responsible adult is recommended to remain with you during that time.  It is normal to feel sleepy.  We encourage you to not do anything that requires balance, judgment or coordination.    FOR 24 HOURS DO NOT:  Drive, operate machinery or run household appliances.  Drink beer or alcoholic beverages.  Make important decisions or sign legal documents.    To avoid nausea, slowly advance diet as tolerated, avoiding spicy or greasy foods for the first day.  Add more substantial food to your diet according to your provider's instructions.  Babies can be fed formula or breast milk as soon as they are hungry.  INCREASE FLUIDS AND FIBER TO AVOID CONSTIPATION.    MILD FLU-LIKE SYMPTOMS ARE NORMAL.  YOU MAY EXPERIENCE GENERALIZED MUSCLE ACHES, THROAT IRRITATION, HEADACHE AND/OR SOME NAUSEA.    Diet    Resume pre-operative diet upon discharge from the hospital. Depending on how you are feeling and whether you have nausea or not, you may wish to stay with a bland diet for the first few days. However, you can advance your diet as quickly as you feel ready.

## 2025-07-10 NOTE — OP REPORT
DATE OF SERVICE:  7/10/2025    SURGEON:  Barry Russell MD     ASSISTANT:  Juana Gamboa PA-C     ANESTHESIOLOGIST:  Dawn Ji MD     ANESTHESIA:  General anesthesia.     PREOPERATIVE DIAGNOSIS:  1.  Left hip labral tear  2.  Left hip femoral acetabular impingement     POSTOPERATIVE DIAGNOSES:    1.  Left hip labral tear  2.  Left hip femoral acetabular impingement  3.  Left hip acetabular chondromalacia  4.  Left hip synovitis  5.  Left hip micro instability     PROCEDURES PERFORMED:  1.  Left hip arthroscopy  2.  Left hip synovectomy  3.  Left hip labral repair  4.  Left hip acetabular chondroplasty  5.  Left hip femoral neck osteochondroplasty  6.  Left hip capsular plication    IMPLANTS:  Trung 1.4 mm NanoTack anchor x 4     INFORMED CONSENT:  The patient was informed of the risks, benefits and alternatives of the planned operation.  The risks include but not limited to bleeding, infection, neurovascular damage, femoral neck fracture, avascular necrosis, heterotopic ossification, instability, pain, stiffness, DVT, PE, MI, stroke, and death.  Advanced directives were reviewed.  After answering all questions, the patient elected to proceed with the planned operation and an informed consent was signed.     PROCEDURE:  The patient was identified in the preoperative holding area.  The patient was then brought to the operating room and underwent a general anesthesia by the anesthesia team.  The patient was then carefully transferred to the distraction table where all bony prominences were well padded.  Both feet were placed in well-padded traction boots and a perineal post placed up against the left medial thigh.  The right lower extremity was abducted to 40 degrees and the left lower extremity was abducted to 10 degrees.  The patient was lateralized with gross axial traction on the right lower extremity followed by gross axial traction on the left lower extremity.  Fine traction was then  applied to the left lower extremity until I obtained about 1-1.5 cm of left hip joint distraction under fluoroscopy guidance.  Distraction was then released and repeat fluoroscopy view demonstrated the femoral head was concentrically reduced within the acetabulum.  The operative lower extremity was then prepped and draped in the normal standard sterile fashion.       A procedural pause was performed with the operating room team.  The procedure, patient's identity, operative side, surgical site, and procedure to be performed were all verified.  The patient was given IV antibiotics prior to incision.    The left lower extremity traction was again implemented under fluoroscopy guidance.  An anterolateral portal was created under fluoroscopy guidance in the standard fashion and the 70-degree arthroscope inserted into the joint.  We then immediately created a modified mid-anterior portal under direct arthroscopic visualization.  With the camera transitioned to the mid-anterior portal, there was no evidence of iatrogenic injury to the labrum or femoral head cartilage.  The camera was then placed back in the anterolateral portal and a full diagnostic arthroscopy was performed.    There was diffuse synovitis through the hip joint.  No obvious tearing of the ligamentum teres.  Examination of the labrum demonstrated tearing of the anterior-superior labrum.  Examination of the acetabular cartilage demonstrated some grade 2 changes involving the peripheral anterior-superior acetabular cartilage as well as a wave sign.  Examination of the femoral head cartilage demonstrated well-preserved cartilage.  Examination of the peripheral compartment demonstrated a proximal femoral cam deformity from approximately the 1 o'clock to 3 o'clock position anteriorly - there was loss of the normal femoral head-neck offset.      A limited interportal capsulotomy was performed.  I then performed a synovectomy through the hip joint with the use of  the oscillating suction shaver device and electrocautery wand.  I then carefully debrided the capsule adjacent to the anterior-superior labrum.  Given the labral tear location and pattern, I elected to perform a formal labral repair.  Using the electrocautery wand, the underlying acetabular bone was exposed.  I then used a 5.5 mm arthroscopic derrick to perform a small acetabular rim trimming, removing approximately 2 mm of prominent acetabular rim bone as well as creating a healthy bed of bleeding bone.  No weightbearing portion of the acetabulum was removed.  I then created a distal anterolateral portal under direct arthroscopic visualization.  We placed four [place] 1.4 mm NanoTack anchors via the DALA portal on to the anterior-superior acetabular rim from approximately the 12 o'clock to 3 o'clock position anteriorly.  The sutures were passed circumferentially around the torn labrum and then tied, resulting in excellent fixation.  Probing of the repaired labrum demonstrated that it was nice and stable.  I then turned my attention to the adjacent acetabular cartilage.  I then performed a gentle chondroplasty of the peripheral anterior-superior acetabular cartilage.  The oscillating suction shaver device was then placed in the joint to remove any soft tissue or bony debris.  Traction was carefully release and the femoral head was noted to be concentrically reduced in the acetabulum with excellent recreation of the normal labral suction seal.  Total traction time was 34 minutes.    Then hip was then flexed to 40 degrees.  Some of the overlying capsule was debrided until I was able to visualize the lateral retinacular vessels and the medial retinacular fold.  Two traction sutures were placed through the distal capsule via the DALA portal to improve visualization.  Under fluoroscopy guidance as well as direct visualization, the area of the anterolateral cam deformity was identified and marked off with the electrocautery  swapnil.  Using a 5.5 mm derrick, the proximal femoral head-neck osteochondroplasty was performed from approximately the 1 o'clock to 3 o'clock position anteriorly.  The hip was then brought through a full range of motion.  Under direct visualization, there was no obvious residual impingement.  The oscillating suction shaver device was then placed into the peripheral compartment to remove any soft tissue or bony debris.  A capsular plication was then performed with 3 non-absorbable #2 sutures, utilizing a figure-of-eight technique.  All instruments were removed.      The portal incisions were copiously irrigated and closed with simple 3-0 Prolene suture followed by Steri-Strips and Xeroform.  The hip joint was then injected with 30 mL of 0.5% ropivacaine.  A sterile compressive dressing was applied followed by a well-padded hip abduction brace.    Needle and sponge counts were correct at the end of the procedure as reported to the surgeon by the circulating nurse.  The patient tolerated the procedure well with no obvious intraoperative complications.  The patient was then transferred off the operating room table on to a regular hospital bed.  The patient was extubated by the anesthesia team.      ESTIMATED BLOOD LOSS:  5 mL     COMPLICATIONS:  None    TRACTION TIME:  34 minutes     SPECIMENS:  None     WOUND TYPE:  Type 1 clean     POSTOPERATIVE PLAN:  The patient will be transferred back to the postoperative unit.  I expect the patient will be discharged from the hospital later today once mobilizing safely and tolerating oral medications.  The patient will remain partial flat-foot weightbearing on the operative lower extremity with the use of crutches and hip brace.  We will begin physical therapy in the next 7-10 days.  The patient will take Aspirin for pharmacological DVT prophylaxis.     A locking adjustable hinged hip brace was provided following the above surgery to limit hip range of motion, including flexion,  extension, abduction, and adduction.  A delay in providing this brace would place the patient at risk of reinjury.  Crutches or walker were provided to assist with ambulation following the above surgery. The patient understands the importance of using the crutches or walker to safely ambulate until they regain strength and stability.

## 2025-07-10 NOTE — ANESTHESIA PREPROCEDURE EVALUATION
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            Relevant Problems   ANESTHESIA (within normal limits)      PULMONARY (within normal limits)      NEURO   (positive) Intractable migraine without aura and with status migrainosus   (positive) Migraine without aura and without status migrainosus, not intractable      CARDIAC   (positive) Intractable migraine without aura and with status migrainosus   (positive) Migraine without aura and without status migrainosus, not intractable      GI   (positive) Gastroesophageal reflux disease      ENDO (within normal limits)       Physical Exam    Airway   Mallampati: II  TM distance: >3 FB  Neck ROM: full       Cardiovascular - normal exam  Rhythm: regular  Rate: normal    (-) murmur     Dental - normal exam           Pulmonary - normal examBreath sounds clear to auscultation     Abdominal    Neurological - normal exam                   Anesthesia Plan    ASA 2       Plan - general       Airway plan will be ETT          Induction: intravenous    Postoperative Plan: Postoperative administration of opioids is intended.    Pertinent diagnostic labs and testing reviewed    Informed Consent:    Anesthetic plan and risks discussed with patient.    Use of blood products discussed with: patient whom consented to blood products.

## 2025-07-10 NOTE — OR NURSING
1217 Pt arrived to PACU from [place] via [place]. Report received from anesthesia and RN. Respirations are spontaneous and unlabored. VSS on 6L. Dressing is CDI. Cold pack applied. Pt c/o 8/10 left hip pain. See MAR, PO analgesia given.     1244 Family updated.    1315 Pt meets criteria for transfer to stage 2.    1327 Report to DEMARCUS Hoskins    1337 Pt to stage 2.

## 2025-07-10 NOTE — ANESTHESIA POSTPROCEDURE EVALUATION
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 Responsible Provider: Dawn Ji M.D.    Anesthesia Type: general ASA Status: 2            Final Anesthesia Type: general  Last vitals  BP   Blood Pressure: 119/71    Temp   36 °C (96.8 °F)    Pulse   84   Resp   16    SpO2   97 %      Anesthesia Post Evaluation    Patient location during evaluation: PACU  Patient participation: complete - patient participated  Level of consciousness: awake and alert    Airway patency: patent  Anesthetic complications: no  Cardiovascular status: hemodynamically stable  Respiratory status: acceptable  Hydration status: euvolemic    PONV: none          No notable events documented.     Nurse Pain Score: 5 (NPRS)

## (undated) DEVICE — MASK OXYGEN VNYL ADLT MED CONC WITH 7 FOOT TUBING - (50EA/CA)

## (undated) DEVICE — SYSTEM CLEARIFY VISUALIZATION (10EA/PK)

## (undated) DEVICE — GLOVE BIOGEL INDICATOR SZ 7SURGICAL PF LTX - (50/BX 4BX/CA)

## (undated) DEVICE — DRAPE VERTICAL ISOLATION - (10EA/CA)

## (undated) DEVICE — CANNULA O2 COMFORT SOFT EAR ADULT 7 FT TUBING (50/CA)

## (undated) DEVICE — WATER IRRIGATION STERILE 1000ML (12EA/CA)

## (undated) DEVICE — SUTURE 2-0 MONOCRYL PLUS UNDYED CT-1 1 X 36 (36EA/BX)"

## (undated) DEVICE — TUBING CASSETTE CROSSFLOW INTEGRATED (1/EA) ORDER MULTIPLES OF 10

## (undated) DEVICE — TOWELS CLOTH SURGICAL - (4/PK 20PK/CA)

## (undated) DEVICE — SODIUM CHL. IRRIGATION 0.9% 3000ML (4EA/CA 65CA/PF)

## (undated) DEVICE — SET LEADWIRE 5 LEAD BEDSIDE DISPOSABLE ECG (1SET OF 5/EA)

## (undated) DEVICE — CANISTER SUCTION 3000ML MECHANICAL FILTER AUTO SHUTOFF MEDI-VAC NONSTERILE LF DISP (40EA/CA)

## (undated) DEVICE — TUBING CLEARLINK DUO-VENT - C-FLO (48EA/CA)

## (undated) DEVICE — STAPLER SKIN DISP - (6/BX 10BX/CA) VISISTAT

## (undated) DEVICE — PASSER SUTURE SLINGSHOT 45 DEGREE UP

## (undated) DEVICE — CHLORAPREP 26 ML APPLICATOR - ORANGE TINT(25/CA)

## (undated) DEVICE — GLOVE BIOGEL INDICATOR SZ 8 SURGICAL PF LTX - (50/BX 4BX/CA)

## (undated) DEVICE — TROCAR 5X100 SEPARTATOR ADV - FIXATION (6/BX)

## (undated) DEVICE — DRAPE C-ARM LARGE 41IN X 74 IN - (10/BX 2BX/CA)

## (undated) DEVICE — Device

## (undated) DEVICE — CLOSURE SKIN STRIP 1/2 X 4 IN - (STERI STRIP) (50/BX 4BX/CA)

## (undated) DEVICE — DRESSING ABDOMINAL PAD STERILE 8 X 10" (360EA/CA)"

## (undated) DEVICE — SET TUBING PNEUMOCLEAR HIGH FLOW SMOKE EVACUATION (10EA/BX)

## (undated) DEVICE — SODIUM CHL IRRIGATION 0.9% 1000ML (12EA/CA)

## (undated) DEVICE — CANISTER SUCTION RIGID RED 1500CC (40EA/CA)

## (undated) DEVICE — GLOVE BIOGEL SZ 7 SURGICAL PF LTX - (50PR/BX 4BX/CA)

## (undated) DEVICE — WRAP CO-FLEX 4IN X 5YD STERIL - SELF-ADHERENT (18/CA)

## (undated) DEVICE — TUBING DAY USE W/CARTRIDGE (1EA) ORDER MULTIPLES OF 10

## (undated) DEVICE — GLOVE BIOGEL INDICATOR SZ 6 SURGICAL PF LTX -(50/BX)

## (undated) DEVICE — CANNULA ENDOSCOPIC TRANSPORT SIZES 7/8/9 (1/EA)

## (undated) DEVICE — GLOVE SZ 6.5 BIOGEL PI MICRO - PF LF (50PR/BX)

## (undated) DEVICE — GLOVE SZ 6 BIOGEL PI MICRO - PF LF (50PR/BX 4BX/CA)

## (undated) DEVICE — ELECTRODE DUAL RETURN W/ CORD - (50/PK)

## (undated) DEVICE — GLOVE BIOGEL SZ 8 SURGICAL PF LTX - (50PR/BX 4BX/CA)

## (undated) DEVICE — GOWN WARMING STANDARD FLEX - (30/CA)

## (undated) DEVICE — TOWEL STOP TIMEOUT SAFETY FLAG (40EA/CA)

## (undated) DEVICE — SUTURE NONABSORBABLE XBRAID #2 26MM (12EA/BX)

## (undated) DEVICE — SUCTION INSTRUMENT YANKAUER BULBOUS TIP W/O VENT (50EA/CA)

## (undated) DEVICE — SENSOR OXIMETER ADULT SPO2 RD SET (20EA/BX)

## (undated) DEVICE — TUBE CONNECTING SUCTION - CLEAR PLASTIC STERILE 72 IN (50EA/CA)

## (undated) DEVICE — GLOVE BIOGEL INDICATOR SZ 6.5 SURGICAL PF LTX - (50PR/BX 4BX/CA)

## (undated) DEVICE — GLOVE SZ 7 BIOGEL PI MICRO - PF LF (50PR/BX 4BX/CA)

## (undated) DEVICE — GLOVE BIOGEL PI INDICATOR SZ 7.0 SURGICAL PF LF - (50/BX 4BX/CA)

## (undated) DEVICE — SUTURE GENERAL

## (undated) DEVICE — SLEEVE VASO DVT COMPRESSION CALF MED - (10PR/CA)

## (undated) DEVICE — SUTURE 4-0 MONOCRYL PLUS PS-2 - 27 INCH (36/BX)

## (undated) DEVICE — PAD SANITARY 11IN MAXI IND WRAPPED (12EA/PK 24PK/CA)

## (undated) DEVICE — SUTURE 3-0 PROLENE PS-1 (12PK/BX)

## (undated) DEVICE — KIT  I.V. START (100EA/CA)

## (undated) DEVICE — LACTATED RINGERS INJ 1000 ML - (14EA/CA 60CA/PF)

## (undated) DEVICE — DRAPESURG STERI-DRAPE LONG - (10/BX 4BX/CA)

## (undated) DEVICE — PACK SHOULDER ARTHROSCOPY SM - (3EA/CA)

## (undated) DEVICE — NEEDLE INSFL 120MM 14GA VRRS - (20/BX)

## (undated) DEVICE — TRAY FOLEY CATHETER STATLOCK 16FR SURESTEP (10EA/CA)

## (undated) DEVICE — BLADE ARTHROSCOPIC SAMURAI FULL RADIUS(1EA)